# Patient Record
Sex: MALE | Race: WHITE | Employment: FULL TIME | URBAN - METROPOLITAN AREA
[De-identification: names, ages, dates, MRNs, and addresses within clinical notes are randomized per-mention and may not be internally consistent; named-entity substitution may affect disease eponyms.]

---

## 2019-12-09 ENCOUNTER — HOSPITAL ENCOUNTER (OUTPATIENT)
Age: 59
Discharge: HOME | End: 2019-12-11
Payer: COMMERCIAL

## 2019-12-09 LAB
ALBUMIN SERPL-MCNC: 4.7 G/DL (ref 3.5–5.2)
ALP BLD-CCNC: 96 U/L (ref 40–129)
ALT SERPL-CCNC: 22 U/L (ref 0–40)
ANION GAP SERPL CALCULATED.3IONS-SCNC: 14 MMOL/L (ref 7–16)
AST SERPL-CCNC: 27 U/L (ref 0–39)
BASOPHILS ABSOLUTE: 0.04 E9/L (ref 0–0.2)
BASOPHILS RELATIVE PERCENT: 0.6 % (ref 0–2)
BILIRUB SERPL-MCNC: 0.6 MG/DL (ref 0–1.2)
BUN BLDV-MCNC: 13 MG/DL (ref 6–20)
CALCIUM SERPL-MCNC: 9.1 MG/DL (ref 8.6–10.2)
CHLORIDE BLD-SCNC: 89 MMOL/L (ref 98–107)
CHOLESTEROL, TOTAL: 221 MG/DL (ref 0–199)
CO2: 23 MMOL/L (ref 22–29)
CREAT SERPL-MCNC: 0.9 MG/DL (ref 0.7–1.2)
EOSINOPHILS ABSOLUTE: 0.15 E9/L (ref 0.05–0.5)
EOSINOPHILS RELATIVE PERCENT: 2.2 % (ref 0–6)
GFR AFRICAN AMERICAN: >60
GFR NON-AFRICAN AMERICAN: >60 ML/MIN/1.73
GLUCOSE BLD-MCNC: 84 MG/DL (ref 74–99)
HCT VFR BLD CALC: 44.6 % (ref 37–54)
HDLC SERPL-MCNC: 68 MG/DL
HEMOGLOBIN: 15.2 G/DL (ref 12.5–16.5)
IMMATURE GRANULOCYTES #: 0.01 E9/L
IMMATURE GRANULOCYTES %: 0.1 % (ref 0–5)
LDL CHOLESTEROL CALCULATED: 133 MG/DL (ref 0–99)
LYMPHOCYTES ABSOLUTE: 1.07 E9/L (ref 1.5–4)
LYMPHOCYTES RELATIVE PERCENT: 15.7 % (ref 20–42)
MCH RBC QN AUTO: 31.3 PG (ref 26–35)
MCHC RBC AUTO-ENTMCNC: 34.1 % (ref 32–34.5)
MCV RBC AUTO: 92 FL (ref 80–99.9)
MONOCYTES ABSOLUTE: 0.57 E9/L (ref 0.1–0.95)
MONOCYTES RELATIVE PERCENT: 8.4 % (ref 2–12)
NEUTROPHILS ABSOLUTE: 4.98 E9/L (ref 1.8–7.3)
NEUTROPHILS RELATIVE PERCENT: 73 % (ref 43–80)
PDW BLD-RTO: 11.6 FL (ref 11.5–15)
PLATELET # BLD: 267 E9/L (ref 130–450)
PMV BLD AUTO: 10.8 FL (ref 7–12)
POTASSIUM SERPL-SCNC: 4.4 MMOL/L (ref 3.5–5)
PROSTATE SPECIFIC ANTIGEN: 0.69 NG/ML (ref 0–4)
RBC # BLD: 4.85 E12/L (ref 3.8–5.8)
SODIUM BLD-SCNC: 126 MMOL/L (ref 132–146)
TOTAL PROTEIN: 7.7 G/DL (ref 6.4–8.3)
TRIGL SERPL-MCNC: 99 MG/DL (ref 0–149)
TSH SERPL DL<=0.05 MIU/L-ACNC: 4.6 UIU/ML (ref 0.27–4.2)
VLDLC SERPL CALC-MCNC: 20 MG/DL
WBC # BLD: 6.8 E9/L (ref 4.5–11.5)

## 2019-12-09 PROCEDURE — G0103 PSA SCREENING: HCPCS

## 2019-12-09 PROCEDURE — 80053 COMPREHEN METABOLIC PANEL: CPT

## 2019-12-09 PROCEDURE — 85025 COMPLETE CBC W/AUTO DIFF WBC: CPT

## 2019-12-09 PROCEDURE — 84443 ASSAY THYROID STIM HORMONE: CPT

## 2019-12-09 PROCEDURE — 80061 LIPID PANEL: CPT

## 2020-03-12 ENCOUNTER — HOSPITAL ENCOUNTER (OUTPATIENT)
Age: 60
Discharge: HOME | End: 2020-03-14
Payer: COMMERCIAL

## 2020-08-28 ENCOUNTER — HOSPITAL ENCOUNTER (OUTPATIENT)
Age: 60
Discharge: HOME | End: 2020-08-30
Payer: COMMERCIAL

## 2020-08-28 LAB
ALBUMIN SERPL-MCNC: 4.6 G/DL (ref 3.5–5.2)
ALP BLD-CCNC: 97 U/L (ref 40–129)
ALT SERPL-CCNC: 32 U/L (ref 0–40)
ANION GAP SERPL CALCULATED.3IONS-SCNC: 14 MMOL/L (ref 7–16)
AST SERPL-CCNC: 31 U/L (ref 0–39)
BASOPHILS ABSOLUTE: 0.04 E9/L (ref 0–0.2)
BASOPHILS RELATIVE PERCENT: 0.5 % (ref 0–2)
BILIRUB SERPL-MCNC: 0.6 MG/DL (ref 0–1.2)
BUN BLDV-MCNC: 8 MG/DL (ref 6–20)
CALCIUM SERPL-MCNC: 9.3 MG/DL (ref 8.6–10.2)
CHLORIDE BLD-SCNC: 90 MMOL/L (ref 98–107)
CO2: 27 MMOL/L (ref 22–29)
CREAT SERPL-MCNC: 0.9 MG/DL (ref 0.7–1.2)
EOSINOPHILS ABSOLUTE: 0.21 E9/L (ref 0.05–0.5)
EOSINOPHILS RELATIVE PERCENT: 2.8 % (ref 0–6)
GFR AFRICAN AMERICAN: >60
GFR NON-AFRICAN AMERICAN: >60 ML/MIN/1.73
GLUCOSE BLD-MCNC: 95 MG/DL (ref 74–99)
HCT VFR BLD CALC: 45.6 % (ref 37–54)
HEMOGLOBIN: 15.8 G/DL (ref 12.5–16.5)
IMMATURE GRANULOCYTES #: 0.02 E9/L
IMMATURE GRANULOCYTES %: 0.3 % (ref 0–5)
LYMPHOCYTES ABSOLUTE: 1.32 E9/L (ref 1.5–4)
LYMPHOCYTES RELATIVE PERCENT: 17.6 % (ref 20–42)
MCH RBC QN AUTO: 32.1 PG (ref 26–35)
MCHC RBC AUTO-ENTMCNC: 34.6 % (ref 32–34.5)
MCV RBC AUTO: 92.7 FL (ref 80–99.9)
MONOCYTES ABSOLUTE: 0.61 E9/L (ref 0.1–0.95)
MONOCYTES RELATIVE PERCENT: 8.1 % (ref 2–12)
NEUTROPHILS ABSOLUTE: 5.29 E9/L (ref 1.8–7.3)
NEUTROPHILS RELATIVE PERCENT: 70.7 % (ref 43–80)
PDW BLD-RTO: 11.6 FL (ref 11.5–15)
PLATELET # BLD: 263 E9/L (ref 130–450)
PMV BLD AUTO: 10.6 FL (ref 7–12)
POTASSIUM SERPL-SCNC: 4.4 MMOL/L (ref 3.5–5)
RBC # BLD: 4.92 E12/L (ref 3.8–5.8)
SODIUM BLD-SCNC: 131 MMOL/L (ref 132–146)
TOTAL PROTEIN: 7.4 G/DL (ref 6.4–8.3)
WBC # BLD: 7.5 E9/L (ref 4.5–11.5)

## 2020-08-28 PROCEDURE — 80053 COMPREHEN METABOLIC PANEL: CPT

## 2020-08-28 PROCEDURE — 85025 COMPLETE CBC W/AUTO DIFF WBC: CPT

## 2021-08-31 ENCOUNTER — HOSPITAL ENCOUNTER (EMERGENCY)
Age: 61
Discharge: HOME | End: 2021-08-31
Payer: COMMERCIAL

## 2021-08-31 VITALS — HEART RATE: 80 BPM | RESPIRATION RATE: 18 BRPM | OXYGEN SATURATION: 97 % | TEMPERATURE: 97.5 F

## 2021-09-01 ENCOUNTER — APPOINTMENT (OUTPATIENT)
Dept: GENERAL RADIOLOGY | Age: 61
End: 2021-09-01
Payer: COMMERCIAL

## 2021-09-01 ENCOUNTER — HOSPITAL ENCOUNTER (EMERGENCY)
Age: 61
Discharge: HOME | End: 2021-09-01
Attending: EMERGENCY MEDICINE
Payer: COMMERCIAL

## 2021-09-01 VITALS
OXYGEN SATURATION: 97 % | HEART RATE: 69 BPM | DIASTOLIC BLOOD PRESSURE: 80 MMHG | TEMPERATURE: 96 F | RESPIRATION RATE: 20 BRPM | SYSTOLIC BLOOD PRESSURE: 162 MMHG
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
## 2021-09-01 DIAGNOSIS — M79.642 HAND PAIN, LEFT: Primary | ICD-10-CM

## 2021-09-01 PROCEDURE — 73130 X-RAY EXAM OF HAND: CPT

## 2021-09-01 PROCEDURE — 99283 EMERGENCY DEPT VISIT LOW MDM: CPT

## 2021-09-01 ASSESSMENT — PAIN DESCRIPTION - LOCATION: LOCATION: WRIST

## 2021-09-01 ASSESSMENT — ENCOUNTER SYMPTOMS
EYE PAIN: 0
SORE THROAT: 0
DIARRHEA: 0
VOMITING: 0
ABDOMINAL PAIN: 0
BACK PAIN: 0
SHORTNESS OF BREATH: 0
NAUSEA: 0

## 2021-09-01 ASSESSMENT — PAIN DESCRIPTION - FREQUENCY: FREQUENCY: CONTINUOUS

## 2021-09-01 ASSESSMENT — PAIN DESCRIPTION - PROGRESSION: CLINICAL_PROGRESSION: NOT CHANGED

## 2021-09-01 ASSESSMENT — PAIN DESCRIPTION - ORIENTATION: ORIENTATION: LEFT

## 2021-09-01 ASSESSMENT — PAIN SCALES - GENERAL: PAINLEVEL_OUTOF10: 6

## 2021-09-01 ASSESSMENT — PAIN DESCRIPTION - DESCRIPTORS: DESCRIPTORS: ACHING;BURNING

## 2021-09-01 NOTE — ED PROVIDER NOTES
[address]      Pt Name: Georgette Walker  MRN: [id number]  [place] 1960  Date of evaluation: 9/1/2021      CHIEF COMPLAINT       Chief Complaint   Patient presents with    Fall     left wrist pain after fall         HPI  Georgette Walker is a 61 y.o. male who presents to the ED with a complaint of left hand pain after a fall yesterday at work at 4 PM.  Patient states he fell backward on an outstretched hand and was immediately able to stand up without difficulty. Patient denies loss of consciousness or head trauma. Patient complains of 5 out of 10 pain in his left hand that is worse with movement and palpation. He states he has numbness in 4 of his fingers. Patient denies fever, chills, nausea, vomiting, chest pain, or shortness of breath. Patient denies any drug use and has not taken anything for pain. Admits to drinking beer. Except as noted above the remainder of the review of systems was reviewed and negative. Review of Systems   Constitutional: Negative for chills and fever. HENT: Negative for ear pain and sore throat. Eyes: Negative for pain. Respiratory: Negative for shortness of breath. Cardiovascular: Negative for chest pain. Gastrointestinal: Negative for abdominal pain, diarrhea, nausea and vomiting. Genitourinary: Negative for flank pain. Musculoskeletal: Positive for arthralgias. Negative for back pain and neck pain. Skin: Negative for rash. Neurological: Positive for numbness. Negative for headaches. Psychiatric/Behavioral: Negative for behavioral problems. The patient is not nervous/anxious. Physical Exam  Constitutional:       General: He is not in acute distress. Appearance: Normal appearance. HENT:      Head: Normocephalic and atraumatic.       Right Ear: External ear normal.      Left Ear: External ear normal.      Nose: Nose normal.      Mouth/Throat: Mouth: Mucous membranes are moist.      Pharynx: No oropharyngeal exudate. Eyes:      Extraocular Movements: Extraocular movements intact. Conjunctiva/sclera: Conjunctivae normal.      Pupils: Pupils are equal, round, and reactive to light. Cardiovascular:      Rate and Rhythm: Normal rate and regular rhythm. Pulses: Normal pulses. Heart sounds: Normal heart sounds. Pulmonary:      Effort: Pulmonary effort is normal. No respiratory distress. Breath sounds: Normal breath sounds. Abdominal:      Tenderness: There is no abdominal tenderness. There is no guarding or rebound. Musculoskeletal:         General: Swelling and tenderness (Left hand) present. No deformity or signs of injury. Normal range of motion. Cervical back: Normal range of motion and neck supple. Skin:     General: Skin is warm and dry. Capillary Refill: Capillary refill takes less than 2 seconds. Neurological:      General: No focal deficit present. Mental Status: He is alert. Mental status is at baseline. Sensory: No sensory deficit. Psychiatric:         Mood and Affect: Mood normal.          Procedures     MDM   Patient is a 60-year-old male who presents with left hand pain after a fall at work. We ordered an x-ray of the hand. X-ray showed no fractures. We gave him a Velcro wrist splint and sent him home with a return to work order on 7 September and instructions to follow-up with Ortho. Patient is awake alert, hemodynamic stable, afebrile and in no respiratory distress. Discussed with patient plan for close outpatient follow-up with the patient's PCP as well as return precautions and the patient understands and agrees to the plan.    --------------------------------------------- PAST HISTORY ---------------------------------------------  Past Medical History:  has a past medical history of Alcohol abuse, Hyperlipidemia, and Hypertension.     Past Surgical History:  has a past surgical history that includes Appendectomy. Social History:  reports that he has been smoking cigarettes. He has been smoking about 1.50 packs per day. He does not have any smokeless tobacco history on file. He reports current alcohol use. Family History: family history is not on file. The patients home medications have been reviewed. Allergies: Ativan [lorazepam]    -------------------------------------------------- RESULTS -------------------------------------------------  Labs:  No results found for this visit on 09/01/21. Radiology:  XR HAND LEFT (MIN 3 VIEWS)   Final Result   1. There is no fracture dislocation of the left hand   2. Degenerative joint disease involving the 1st carpometacarpal joint.             ------------------------- NURSING NOTES AND VITALS REVIEWED ---------------------------  Date / Time Roomed:  9/1/2021  7:00 AM  ED Bed Assignment:  [id number]    The nursing notes within the ED encounter and vital signs as below have been reviewed. BP (!) 162/80   Pulse 69   Temp 96 °F (35.6 °C) (Infrared)   Resp 20   SpO2 97%   Oxygen Saturation Interpretation: normal      ------------------------------------------ PROGRESS NOTES ------------------------------------------    I have spoken with the patient and discussed todays results, in addition to providing specific details for the plan of care and counseling regarding the diagnosis and prognosis. Their questions are answered at this time and they are agreeable with the plan. I discussed at length with them reasons for immediate return here for re evaluation. They will followup with their PCP by calling their office tomorrow. --------------------------------- ADDITIONAL PROVIDER NOTES ---------------------------------  At this time the patient is without objective evidence of an acute process requiring hospitalization or inpatient management.   They have remained hemodynamically stable throughout their entire ED visit and are

## 2024-04-10 ENCOUNTER — OFFICE VISIT (OUTPATIENT)
Dept: PODIATRY | Age: 64
End: 2024-04-10
Payer: COMMERCIAL

## 2024-04-10 VITALS — WEIGHT: 210 LBS | BODY MASS INDEX: 32.96 KG/M2 | HEIGHT: 67 IN

## 2024-04-10 DIAGNOSIS — L60.0 OC (ONYCHOCRYPTOSIS): ICD-10-CM

## 2024-04-10 DIAGNOSIS — R26.2 DIFFICULTY WALKING: ICD-10-CM

## 2024-04-10 DIAGNOSIS — L84 CORNS AND CALLUS: ICD-10-CM

## 2024-04-10 DIAGNOSIS — M20.41 HAMMER TOE OF RIGHT FOOT: ICD-10-CM

## 2024-04-10 DIAGNOSIS — B35.1 ONYCHOMYCOSIS: Primary | ICD-10-CM

## 2024-04-10 PROCEDURE — 99203 OFFICE O/P NEW LOW 30 MIN: CPT | Performed by: PODIATRIST

## 2024-04-10 RX ORDER — ATORVASTATIN CALCIUM 20 MG/1
20 TABLET, FILM COATED ORAL DAILY
COMMUNITY
Start: 2023-01-12

## 2024-04-10 NOTE — PROGRESS NOTES
4/10/24     Edoneil Eckroate    : 1960 Sex: male   Age: 63 y.o.    Patient was referred by: None  Patient's PCP/Provider is:  Rebel Rubio MD    Subjective:    Patient is seen today for evaluation regarding painful corn/callus right fifth toe.  Patient also having issues with chronic nail dystrophy and ingrown issues to both feet.  No other abnormalities noted at this time.    Chief Complaint   Patient presents with    Toe Pain     Right fifth toe lesion, ? wart    Nail Problem     NAIL CARE       HPI: Patient stated he has tried multiple OTC options without improvement noted right fifth toe.  Patient stated he has tried debriding his ingrown nails without improvement as well.  Patient does try to wear appropriate shoe gear on a daily basis.  No other additional abnormalities noted.    ROS:  Const: Positives and pertinent negatives as per HPI.     Musculo: Denies symptoms other than stated above.  Neuro: Denies symptoms other than stated above.  Skin: Denies symptoms other than stated above.    Current Medications:    Current Outpatient Medications:     atorvastatin (LIPITOR) 20 MG tablet, Take 1 tablet by mouth daily, Disp: , Rfl:     vitamin B-1 100 MG tablet, Take 1 tablet by mouth daily, Disp: 30 tablet, Rfl: 3    Metoprolol Tartrate (LOPRESSOR PO), Take 50 mg by mouth daily , Disp: , Rfl:     Sertraline HCl (ZOLOFT PO), Take 50 mg by mouth daily , Disp: , Rfl:     Allergies:  Allergies   Allergen Reactions    Ativan [Lorazepam] Other (See Comments)     Violent reaction       Vitals:    04/10/24 0839   Weight: 95.3 kg (210 lb)   Height: 1.702 m (5' 7\")        Past Medical History:   Diagnosis Date    Alcohol abuse 8/31/15    Hyperlipidemia     Hypertension      No family history on file.  Past Surgical History:   Procedure Laterality Date    APPENDECTOMY       Social History     Tobacco Use    Smoking status: Every Day     Current packs/day: 1.50     Types: Cigarettes   Substance Use Topics    Alcohol

## 2024-04-10 NOTE — PROGRESS NOTES
New patient here for right fifth toe lesion. Patient thinks it is a wart. Rebel Rubio MD Patient does not know last time he saw his Primary Care Doctor.   Electronically signed by Bryanna Cordova LPN on 4/10/2024 at 8:42 AM

## 2024-12-13 ENCOUNTER — HOSPITAL ENCOUNTER (OUTPATIENT)
Dept: GENERAL RADIOLOGY | Age: 64
Discharge: HOME | End: 2024-12-15
Payer: COMMERCIAL

## 2024-12-13 ENCOUNTER — HOSPITAL ENCOUNTER (OUTPATIENT)
Age: 64
Discharge: HOME | End: 2024-12-15
Payer: COMMERCIAL

## 2024-12-13 DIAGNOSIS — M48.062 SPINAL STENOSIS, LUMBAR REGION, WITH NEUROGENIC CLAUDICATION: ICD-10-CM

## 2024-12-13 PROCEDURE — 72100 X-RAY EXAM L-S SPINE 2/3 VWS: CPT

## 2025-01-20 ENCOUNTER — TELEPHONE (OUTPATIENT)
Dept: INTERVENTIONAL RADIOLOGY/VASCULAR | Age: 65
End: 2025-01-20

## 2025-01-20 NOTE — TELEPHONE ENCOUNTER
Spoke with patient and confirmed vascular testing appointment on 01/21/2025 at 8:00 am. Instructed patient to arrive 15 minutes prior to appointment time, Enter through Entrance B, register to right of entrance, and report to Cardiac Services for test. Patient verbalized understanding. Questions answered.

## 2025-01-21 ENCOUNTER — HOSPITAL ENCOUNTER (OUTPATIENT)
Dept: INTERVENTIONAL RADIOLOGY/VASCULAR | Age: 65
Discharge: HOME | End: 2025-01-23
Payer: COMMERCIAL

## 2025-01-21 DIAGNOSIS — I70.213 ATHEROSCLEROSIS OF NATIVE ARTERY OF BOTH LOWER EXTREMITIES WITH INTERMITTENT CLAUDICATION (HCC): ICD-10-CM

## 2025-01-21 DIAGNOSIS — R73.9 HYPERGLYCEMIA: ICD-10-CM

## 2025-01-21 DIAGNOSIS — I10 ESSENTIAL HYPERTENSION, BENIGN: ICD-10-CM

## 2025-01-21 PROCEDURE — 93925 LOWER EXTREMITY STUDY: CPT

## 2025-01-24 ENCOUNTER — TRANSCRIBE ORDERS (OUTPATIENT)
Dept: ADMINISTRATIVE | Age: 65
End: 2025-01-24

## 2025-01-24 DIAGNOSIS — I65.23 OCCLUSION AND STENOSIS OF BILATERAL CAROTID ARTERIES: Primary | ICD-10-CM

## 2025-01-27 ENCOUNTER — TRANSCRIBE ORDERS (OUTPATIENT)
Dept: ADMINISTRATIVE | Age: 65
End: 2025-01-27

## 2025-01-27 ENCOUNTER — TELEPHONE (OUTPATIENT)
Dept: VASCULAR SURGERY | Age: 65
End: 2025-01-27

## 2025-01-27 DIAGNOSIS — I70.213 ATHEROSCLEROSIS OF NATIVE ARTERIES OF EXTREMITIES WITH INTERMITTENT CLAUDICATION, BILATERAL LEGS (HCC): Primary | ICD-10-CM

## 2025-01-27 DIAGNOSIS — I70.213 ATHSCL NATIVE ARTERIES OF EXTRM W INTRMT CLAUD, BI LEGS (HCC): Primary | ICD-10-CM

## 2025-01-27 NOTE — TELEPHONE ENCOUNTER
Received referral from Dr. Rubio for Dr. Montgomery regarding PAD, left message for patient to return call to schedule in [place]

## 2025-01-30 ENCOUNTER — TELEPHONE (OUTPATIENT)
Dept: VASCULAR SURGERY | Age: 65
End: 2025-01-30

## 2025-01-30 NOTE — TELEPHONE ENCOUNTER
Spoke with patient's girlfriend. Scheduled appointment to see Dr. Montgomery at the [facility] on 2-25-25 at 10:00 am.

## 2025-02-24 ENCOUNTER — HOSPITAL ENCOUNTER (OUTPATIENT)
Dept: CT IMAGING | Age: 65
Discharge: HOME | End: 2025-02-24
Attending: INTERNAL MEDICINE
Payer: COMMERCIAL

## 2025-02-24 ENCOUNTER — HOSPITAL ENCOUNTER (OUTPATIENT)
Dept: ULTRASOUND IMAGING | Age: 65
Discharge: HOME | End: 2025-02-24
Attending: INTERNAL MEDICINE
Payer: COMMERCIAL

## 2025-02-24 DIAGNOSIS — I70.213 ATHSCL NATIVE ARTERIES OF EXTRM W INTRMT CLAUD, BI LEGS: ICD-10-CM

## 2025-02-24 DIAGNOSIS — I70.213 ATHEROSCLEROSIS OF NATIVE ARTERIES OF EXTREMITIES WITH INTERMITTENT CLAUDICATION, BILATERAL LEGS: ICD-10-CM

## 2025-02-24 DIAGNOSIS — I65.23 OCCLUSION AND STENOSIS OF BILATERAL CAROTID ARTERIES: ICD-10-CM

## 2025-02-24 PROCEDURE — 73706 CT ANGIO LWR EXTR W/O&W/DYE: CPT

## 2025-02-24 PROCEDURE — 93880 EXTRACRANIAL BILAT STUDY: CPT

## 2025-02-24 PROCEDURE — 6360000004 HC RX CONTRAST MEDICATION: Performed by: RADIOLOGY

## 2025-02-24 RX ORDER — IOPAMIDOL 755 MG/ML
110 INJECTION, SOLUTION INTRAVASCULAR
Status: COMPLETED | OUTPATIENT
Start: 2025-02-24 | End: 2025-02-24

## 2025-02-24 RX ADMIN — IOPAMIDOL 110 ML: 755 INJECTION, SOLUTION INTRAVENOUS at 07:12

## 2025-02-25 ENCOUNTER — TELEPHONE (OUTPATIENT)
Dept: VASCULAR SURGERY | Age: 65
End: 2025-02-25

## 2025-02-25 ENCOUNTER — OFFICE VISIT (OUTPATIENT)
Dept: VASCULAR SURGERY | Age: 65
End: 2025-02-25
Payer: COMMERCIAL

## 2025-02-25 DIAGNOSIS — I70.213 ATHEROSCLEROSIS OF NATIVE ARTERY OF BOTH LOWER EXTREMITIES WITH INTERMITTENT CLAUDICATION: ICD-10-CM

## 2025-02-25 DIAGNOSIS — I70.213 ATHEROSCLEROSIS OF NATIVE ARTERY OF BOTH LOWER EXTREMITIES WITH INTERMITTENT CLAUDICATION: Primary | ICD-10-CM

## 2025-02-25 PROCEDURE — 99203 OFFICE O/P NEW LOW 30 MIN: CPT | Performed by: SURGERY

## 2025-02-25 RX ORDER — GABAPENTIN 100 MG/1
100 CAPSULE ORAL 3 TIMES DAILY
COMMUNITY

## 2025-02-25 RX ORDER — CILOSTAZOL 100 MG/1
100 TABLET ORAL 2 TIMES DAILY
Qty: 60 TABLET | Refills: 3 | OUTPATIENT
Start: 2025-02-25

## 2025-02-25 RX ORDER — CILOSTAZOL 100 MG/1
100 TABLET ORAL 2 TIMES DAILY
Qty: 60 TABLET | Refills: 3 | Status: SHIPPED | OUTPATIENT
Start: 2025-02-25

## 2025-02-25 RX ORDER — FUROSEMIDE 40 MG/1
40 TABLET ORAL DAILY
COMMUNITY
Start: 2024-12-17

## 2025-02-25 RX ORDER — UBIDECARENONE 75 MG
200 CAPSULE ORAL DAILY
COMMUNITY

## 2025-02-25 RX ORDER — LISINOPRIL 20 MG/1
TABLET ORAL
COMMUNITY

## 2025-02-25 RX ORDER — BUPROPION HYDROCHLORIDE 150 MG/1
150 TABLET, EXTENDED RELEASE ORAL 2 TIMES DAILY
COMMUNITY
Start: 2024-12-30

## 2025-02-25 RX ORDER — TIOTROPIUM BROMIDE INHALATION SPRAY 3.12 UG/1
2 SPRAY, METERED RESPIRATORY (INHALATION)
COMMUNITY
Start: 2024-12-22

## 2025-02-25 NOTE — PROGRESS NOTES
Vascular Surgery Outpatient Consultation      Chief Complaint   Patient presents with    Surgical Consult     Pain lower legs, feet feel numb, no swelling.        Reason for Consult: Peripheral vascular disease    Requesting Physician:  Dr. Rubio    HISTORY OF PRESENT ILLNESS:                The patient is a 64 y.o. male who is referred for evaluation of peripheral vascular disease.  The patient is accompanied by his girlfriend.  He has a long history of smoking and known vascular disease.  He has a history of COPD and alcohol use.  More recently, he has been experiencing disabling claudication.  He denies foot ulcerations or rest pain.  He states that with prolonged walking, he develops significant cramping in his calfs.  He does not notice 1 leg worse than the other.  He admits to continued smoking slightly less than 1 pack/day.    Past Medical History:        Diagnosis Date    Alcohol abuse 8/31/15    Hyperlipidemia     Hypertension      Past Surgical History:        Procedure Laterality Date    APPENDECTOMY       Current Medications:   Prior to Admission medications    Medication Sig Start Date End Date Taking? Authorizing Provider   buPROPion (WELLBUTRIN SR) 150 MG extended release tablet Take 1 tablet by mouth 2 times daily 12/30/24  Yes Taiwo Cee MD   Coenzyme Q10 (CO Q-10) 200 MG CAPS Take 1 capsule by mouth daily   Yes ProviderTaiwo MD   furosemide (LASIX) 40 MG tablet Take 1 tablet by mouth daily 12/17/24  Yes Taiwo Cee MD   gabapentin (NEURONTIN) 100 MG capsule Take 1 capsule by mouth 3 times daily.   Yes Taiwo Cee MD   lisinopril (PRINIVIL;ZESTRIL) 20 MG tablet Take by mouth   Yes Taiwo Cee MD   SPIRIVA RESPIMAT 2.5 MCG/ACT AERS inhaler Inhale 2 puffs into the lungs daily 12/22/24  Yes Taiwo Cee MD   cilostazol (PLETAL) 100 MG tablet Take 1 tablet by mouth 2 times daily 2/25/25  Yes Froylan Montgomery MD   atorvastatin (LIPITOR) 20 MG

## 2025-02-25 NOTE — TELEPHONE ENCOUNTER
I called Bobby to inform him , he has a scheduled appointment. His appointment is at [facility] on May 19, 2025 , at 10:15 am. to register for a 10:30 Ultra Sound. He can enter in [employer]. Bobby should bring his insurance cards, photo Id. And his list of Medications.

## 2025-02-27 ENCOUNTER — TRANSCRIBE ORDERS (OUTPATIENT)
Dept: ADMINISTRATIVE | Age: 65
End: 2025-02-27

## 2025-02-27 DIAGNOSIS — I70.213 ATHSCL NATIVE ARTERIES OF EXTRM W INTRMT CLAUD, BI LEGS: Primary | ICD-10-CM

## 2025-02-27 DIAGNOSIS — I65.23 OCCLUSION AND STENOSIS OF BILATERAL CAROTID ARTERIES: ICD-10-CM

## 2025-05-16 ENCOUNTER — TELEPHONE (OUTPATIENT)
Dept: INTERVENTIONAL RADIOLOGY/VASCULAR | Age: 65
End: 2025-05-16

## 2025-05-16 NOTE — TELEPHONE ENCOUNTER
Spoke with patient's girlfriend Phyllis and confirmed vascular testing appointment on 05/19/2025 at 11:00 am. Instructed patient to arrive 20 minutes prior to appointment time, Enter through Entrance B, register to right of entrance, and report to Cardiac Services for test.

## 2025-05-19 ENCOUNTER — APPOINTMENT (OUTPATIENT)
Dept: ULTRASOUND IMAGING | Age: 65
End: 2025-05-19
Attending: INTERNAL MEDICINE
Payer: COMMERCIAL

## 2025-05-19 ENCOUNTER — HOSPITAL ENCOUNTER (OUTPATIENT)
Dept: INTERVENTIONAL RADIOLOGY/VASCULAR | Age: 65
Discharge: HOME | End: 2025-05-21
Attending: SURGERY
Payer: COMMERCIAL

## 2025-05-19 DIAGNOSIS — I70.213 ATHEROSCLEROSIS OF NATIVE ARTERY OF BOTH LOWER EXTREMITIES WITH INTERMITTENT CLAUDICATION: ICD-10-CM

## 2025-05-19 PROCEDURE — 93922 UPR/L XTREMITY ART 2 LEVELS: CPT

## 2025-05-27 ENCOUNTER — OFFICE VISIT (OUTPATIENT)
Dept: VASCULAR SURGERY | Age: 65
End: 2025-05-27
Payer: COMMERCIAL

## 2025-05-27 ENCOUNTER — TELEPHONE (OUTPATIENT)
Dept: VASCULAR SURGERY | Age: 65
End: 2025-05-27

## 2025-05-27 DIAGNOSIS — I73.9 PVD (PERIPHERAL VASCULAR DISEASE) WITH CLAUDICATION: Primary | ICD-10-CM

## 2025-05-27 PROCEDURE — 99213 OFFICE O/P EST LOW 20 MIN: CPT

## 2025-05-27 NOTE — TELEPHONE ENCOUNTER
Scheduled abdominal aortogram possible intervention with Dr. Hagen 6/4/25 at 7:30 a.m.  Instructions given to report to [address] floor registration at 6:15 a.m, be NPO after midnight the night before except heart and/or BP meds the morning of with sips of water and must have transportation.

## 2025-05-27 NOTE — PROGRESS NOTES
Vascular Surgery Outpatient Progress Note      Chief Complaint   Patient presents with    Follow-up     Atherosclerosis.        HISTORY OF PRESENT ILLNESS:                The patient is a 64 y.o. male who returns for follow up evaluation of peripheral vascular disease with disabling claudication.  Since last seen he has stopped smoking.  He has been taking pletal with no relief in his symptoms.  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  He has to stop and rest and the symptoms improve.  The right leg is more painful than the left.  He cannot take the pain and feels something needs done.  He states he may need to take a leave of absence from work due to the pain.     Past Medical History:        Diagnosis Date    Alcohol abuse 8/31/15    Hyperlipidemia     Hypertension      Past Surgical History:        Procedure Laterality Date    APPENDECTOMY       Current Medications:   Prior to Admission medications    Medication Sig Start Date End Date Taking? Authorizing Provider   buPROPion (WELLBUTRIN SR) 150 MG extended release tablet Take 1 tablet by mouth 2 times daily 12/30/24  Yes Taiwo Cee MD   Coenzyme Q10 (CO Q-10) 200 MG CAPS Take 1 capsule by mouth daily   Yes Taiwo Cee MD   furosemide (LASIX) 40 MG tablet Take 1 tablet by mouth daily 12/17/24  Yes Taiwo Cee MD   gabapentin (NEURONTIN) 100 MG capsule Take 1 capsule by mouth 3 times daily.   Yes Taiwo Cee MD   lisinopril (PRINIVIL;ZESTRIL) 20 MG tablet Take by mouth   Yes Taiwo Cee MD   SPIRIVA RESPIMAT 2.5 MCG/ACT AERS inhaler Inhale 2 puffs into the lungs daily 12/22/24  Yes Taiwo Cee MD   cilostazol (PLETAL) 100 MG tablet Take 1 tablet by mouth 2 times daily 2/25/25  Yes Froylan Montgomery MD   atorvastatin (LIPITOR) 20 MG tablet Take 1 tablet by mouth daily 1/12/23  Yes Taiwo Cee MD   vitamin B-1 100 MG tablet Take 1 tablet by mouth daily

## 2025-05-27 NOTE — H&P (VIEW-ONLY)
Nausea or vomiting:  No [x]/Yes []               Abdominal pain:  No [x]/Yes []                     Intestinal bleeding: No [x]/Yes []  Musculoskeletal:             Leg pain:   No []/Yes [x]      Back pain:   No [x]/Yes []                    Weakness:   No [x]/Yes []  Neurologic:             Numbness:   No []/Yes [x]      Paralysis:   No [x]/Yes []                       Headaches:   No [x]/Yes []  Hematologic, lymphatic:   Anemia:   No [x]/Yes []              Bleeding or bruising:  No [x]/Yes []              Fevers or chills: No [x]/Yes []  Endocrine:             Temp intolerance:   No [x]/Yes []                       Polydipsia, polyuria:  No [x]/Yes []  Skin:              Rash:    No [x]/Yes []      Ulcers:   No [x]/Yes []              Abnorm pigment: No [x]/Yes []  :              Frequency/urgency:  No [x]/Yes []      Hematuria:    No [x]/Yes []                      Incontinence:    No [x]/Yes []    PHYSICAL EXAM:  There were no vitals filed for this visit.  General Appearance: alert and oriented to person, place and time, well developed and well- nourished, in no acute distress  Skin: warm and dry, no rash or erythema  Head: normocephalic and atraumatic  Eyes: extraocular eye movements intact, conjunctivae normal  ENT: external ear and ear canal normal bilaterally, nose without deformity  Pulmonary/Chest: clear to auscultation bilaterally- no wheezes, rales or rhonchi, normal air movement, no respiratory distress  Cardiovascular: normal rate, regular rhythm, normal S1 and S2, no murmurs, no carotid bruits  Abdomen: soft, non-tender, non-distended, normal bowel sounds, no masses or organomegaly  Musculoskeletal: normal range of motion, no joint swelling, deformity or tenderness  Neurologic: no cranial nerve deficit, gait, coordination and speech normal  Extremities: no leg edema bilaterally    PULSE EXAM      Right      Left   Brachial     Radial 2 2   Femoral     Popliteal 0 0   Dorsalis Pedis 0 0   Posterior

## 2025-06-04 ENCOUNTER — HOSPITAL ENCOUNTER (OUTPATIENT)
Age: 65
Discharge: HOME | End: 2025-06-04
Attending: STUDENT IN AN ORGANIZED HEALTH CARE EDUCATION/TRAINING PROGRAM | Admitting: STUDENT IN AN ORGANIZED HEALTH CARE EDUCATION/TRAINING PROGRAM
Payer: COMMERCIAL

## 2025-06-04 VITALS
RESPIRATION RATE: 15 BRPM | SYSTOLIC BLOOD PRESSURE: 119 MMHG | HEART RATE: 86 BPM | WEIGHT: 205 LBS | BODY MASS INDEX: 32.18 KG/M2 | OXYGEN SATURATION: 98 % | DIASTOLIC BLOOD PRESSURE: 79 MMHG | HEIGHT: 67 IN | TEMPERATURE: 98.1 F

## 2025-06-04 DIAGNOSIS — I73.9 PERIPHERAL VASCULAR DISEASE, UNSPECIFIED: ICD-10-CM

## 2025-06-04 LAB
ABO + RH BLD: NORMAL
ANION GAP SERPL CALCULATED.3IONS-SCNC: 13 MMOL/L (ref 7–16)
ARM BAND NUMBER: NORMAL
BLOOD BANK SAMPLE EXPIRATION: NORMAL
BLOOD GROUP ANTIBODIES SERPL: NEGATIVE
BUN SERPL-MCNC: 9 MG/DL (ref 8–23)
CALCIUM SERPL-MCNC: 9.7 MG/DL (ref 8.8–10.2)
CHLORIDE SERPL-SCNC: 92 MMOL/L (ref 98–107)
CO2 SERPL-SCNC: 25 MMOL/L (ref 22–29)
CREAT SERPL-MCNC: 0.8 MG/DL (ref 0.7–1.2)
ECHO BSA: 2.1 M2
ERYTHROCYTE [DISTWIDTH] IN BLOOD BY AUTOMATED COUNT: 11.8 % (ref 11.5–15)
GFR, ESTIMATED: >90 ML/MIN/1.73M2
GLUCOSE SERPL-MCNC: 125 MG/DL (ref 74–99)
HCT VFR BLD AUTO: 41.9 % (ref 37–54)
HGB BLD-MCNC: 15.3 G/DL (ref 12.5–16.5)
MCH RBC QN AUTO: 32.1 PG (ref 26–35)
MCHC RBC AUTO-ENTMCNC: 36.5 G/DL (ref 32–34.5)
MCV RBC AUTO: 88 FL (ref 80–99.9)
PLATELET # BLD AUTO: 249 K/UL (ref 130–450)
PMV BLD AUTO: 9.3 FL (ref 7–12)
POTASSIUM SERPL-SCNC: 3.9 MMOL/L (ref 3.5–5.1)
RBC # BLD AUTO: 4.76 M/UL (ref 3.8–5.8)
SODIUM SERPL-SCNC: 130 MMOL/L (ref 136–145)
WBC OTHER # BLD: 5.7 K/UL (ref 4.5–11.5)

## 2025-06-04 PROCEDURE — 86901 BLOOD TYPING SEROLOGIC RH(D): CPT

## 2025-06-04 PROCEDURE — 6360000002 HC RX W HCPCS: Performed by: STUDENT IN AN ORGANIZED HEALTH CARE EDUCATION/TRAINING PROGRAM

## 2025-06-04 PROCEDURE — 2709999900 HC NON-CHARGEABLE SUPPLY: Performed by: STUDENT IN AN ORGANIZED HEALTH CARE EDUCATION/TRAINING PROGRAM

## 2025-06-04 PROCEDURE — C1725 CATH, TRANSLUMIN NON-LASER: HCPCS | Performed by: STUDENT IN AN ORGANIZED HEALTH CARE EDUCATION/TRAINING PROGRAM

## 2025-06-04 PROCEDURE — 76937 US GUIDE VASCULAR ACCESS: CPT | Performed by: STUDENT IN AN ORGANIZED HEALTH CARE EDUCATION/TRAINING PROGRAM

## 2025-06-04 PROCEDURE — 6370000000 HC RX 637 (ALT 250 FOR IP): Performed by: STUDENT IN AN ORGANIZED HEALTH CARE EDUCATION/TRAINING PROGRAM

## 2025-06-04 PROCEDURE — 75774 ARTERY X-RAY EACH VESSEL: CPT | Performed by: STUDENT IN AN ORGANIZED HEALTH CARE EDUCATION/TRAINING PROGRAM

## 2025-06-04 PROCEDURE — 80048 BASIC METABOLIC PNL TOTAL CA: CPT

## 2025-06-04 PROCEDURE — 75625 CONTRAST EXAM ABDOMINL AORTA: CPT | Performed by: STUDENT IN AN ORGANIZED HEALTH CARE EDUCATION/TRAINING PROGRAM

## 2025-06-04 PROCEDURE — 86900 BLOOD TYPING SEROLOGIC ABO: CPT

## 2025-06-04 PROCEDURE — 37224 HC FEM POP TERRITORY PLASTY: CPT | Performed by: STUDENT IN AN ORGANIZED HEALTH CARE EDUCATION/TRAINING PROGRAM

## 2025-06-04 PROCEDURE — 75716 ARTERY X-RAYS ARMS/LEGS: CPT | Performed by: STUDENT IN AN ORGANIZED HEALTH CARE EDUCATION/TRAINING PROGRAM

## 2025-06-04 PROCEDURE — C1894 INTRO/SHEATH, NON-LASER: HCPCS | Performed by: STUDENT IN AN ORGANIZED HEALTH CARE EDUCATION/TRAINING PROGRAM

## 2025-06-04 PROCEDURE — C2623 CATH, TRANSLUMIN, DRUG-COAT: HCPCS | Performed by: STUDENT IN AN ORGANIZED HEALTH CARE EDUCATION/TRAINING PROGRAM

## 2025-06-04 PROCEDURE — 2580000003 HC RX 258

## 2025-06-04 PROCEDURE — 7100000010 HC PHASE II RECOVERY - FIRST 15 MIN: Performed by: STUDENT IN AN ORGANIZED HEALTH CARE EDUCATION/TRAINING PROGRAM

## 2025-06-04 PROCEDURE — C1760 CLOSURE DEV, VASC: HCPCS | Performed by: STUDENT IN AN ORGANIZED HEALTH CARE EDUCATION/TRAINING PROGRAM

## 2025-06-04 PROCEDURE — 6360000004 HC RX CONTRAST MEDICATION: Performed by: STUDENT IN AN ORGANIZED HEALTH CARE EDUCATION/TRAINING PROGRAM

## 2025-06-04 PROCEDURE — 2500000003 HC RX 250 WO HCPCS

## 2025-06-04 PROCEDURE — 85027 COMPLETE CBC AUTOMATED: CPT

## 2025-06-04 PROCEDURE — 7100000011 HC PHASE II RECOVERY - ADDTL 15 MIN: Performed by: STUDENT IN AN ORGANIZED HEALTH CARE EDUCATION/TRAINING PROGRAM

## 2025-06-04 PROCEDURE — C1887 CATHETER, GUIDING: HCPCS | Performed by: STUDENT IN AN ORGANIZED HEALTH CARE EDUCATION/TRAINING PROGRAM

## 2025-06-04 PROCEDURE — 6360000002 HC RX W HCPCS

## 2025-06-04 PROCEDURE — 86850 RBC ANTIBODY SCREEN: CPT

## 2025-06-04 PROCEDURE — C1769 GUIDE WIRE: HCPCS | Performed by: STUDENT IN AN ORGANIZED HEALTH CARE EDUCATION/TRAINING PROGRAM

## 2025-06-04 DEVICE — ANGIO-SEAL VIP VASCULAR CLOSURE DEVICE
Type: IMPLANTABLE DEVICE | Status: FUNCTIONAL
Brand: ANGIO-SEAL

## 2025-06-04 RX ORDER — SODIUM CHLORIDE 9 MG/ML
INJECTION, SOLUTION INTRAVENOUS PRN
Status: DISCONTINUED | OUTPATIENT
Start: 2025-06-04 | End: 2025-06-04 | Stop reason: HOSPADM

## 2025-06-04 RX ORDER — FENTANYL CITRATE 50 UG/ML
INJECTION, SOLUTION INTRAMUSCULAR; INTRAVENOUS PRN
Status: DISCONTINUED | OUTPATIENT
Start: 2025-06-04 | End: 2025-06-04 | Stop reason: HOSPADM

## 2025-06-04 RX ORDER — CLOPIDOGREL BISULFATE 75 MG/1
TABLET ORAL PRN
Status: DISCONTINUED | OUTPATIENT
Start: 2025-06-04 | End: 2025-06-04 | Stop reason: HOSPADM

## 2025-06-04 RX ORDER — ISOSORBIDE MONONITRATE 30 MG/1
30 TABLET, EXTENDED RELEASE ORAL DAILY
Status: ON HOLD | COMMUNITY

## 2025-06-04 RX ORDER — MIDAZOLAM HYDROCHLORIDE 1 MG/ML
INJECTION, SOLUTION INTRAMUSCULAR; INTRAVENOUS PRN
Status: DISCONTINUED | OUTPATIENT
Start: 2025-06-04 | End: 2025-06-04 | Stop reason: HOSPADM

## 2025-06-04 RX ORDER — SODIUM CHLORIDE 9 MG/ML
INJECTION, SOLUTION INTRAVENOUS CONTINUOUS
Status: DISCONTINUED | OUTPATIENT
Start: 2025-06-04 | End: 2025-06-04 | Stop reason: HOSPADM

## 2025-06-04 RX ORDER — HEPARIN SODIUM 1000 [USP'U]/ML
INJECTION, SOLUTION INTRAVENOUS; SUBCUTANEOUS PRN
Status: DISCONTINUED | OUTPATIENT
Start: 2025-06-04 | End: 2025-06-04 | Stop reason: HOSPADM

## 2025-06-04 RX ORDER — ACETAMINOPHEN 325 MG/1
650 TABLET ORAL EVERY 4 HOURS PRN
Status: DISCONTINUED | OUTPATIENT
Start: 2025-06-04 | End: 2025-06-04 | Stop reason: HOSPADM

## 2025-06-04 RX ORDER — ONDANSETRON 2 MG/ML
4 INJECTION INTRAMUSCULAR; INTRAVENOUS EVERY 8 HOURS PRN
Status: DISCONTINUED | OUTPATIENT
Start: 2025-06-04 | End: 2025-06-04 | Stop reason: HOSPADM

## 2025-06-04 RX ORDER — IOPAMIDOL 612 MG/ML
INJECTION, SOLUTION INTRAVASCULAR PRN
Status: DISCONTINUED | OUTPATIENT
Start: 2025-06-04 | End: 2025-06-04 | Stop reason: HOSPADM

## 2025-06-04 RX ORDER — SODIUM CHLORIDE 0.9 % (FLUSH) 0.9 %
5-40 SYRINGE (ML) INJECTION PRN
Status: DISCONTINUED | OUTPATIENT
Start: 2025-06-04 | End: 2025-06-04 | Stop reason: HOSPADM

## 2025-06-04 RX ORDER — CLOPIDOGREL BISULFATE 75 MG/1
75 TABLET ORAL DAILY
Status: ON HOLD | COMMUNITY

## 2025-06-04 RX ORDER — SODIUM CHLORIDE 0.9 % (FLUSH) 0.9 %
5-40 SYRINGE (ML) INJECTION EVERY 12 HOURS SCHEDULED
Status: DISCONTINUED | OUTPATIENT
Start: 2025-06-04 | End: 2025-06-04 | Stop reason: HOSPADM

## 2025-06-04 RX ADMIN — SODIUM CHLORIDE 75 ML/HR: 0.9 INJECTION, SOLUTION INTRAVENOUS at 06:56

## 2025-06-04 NOTE — OP NOTE
Operative Note      Patient: Bobby Dubose  YOB: 1960  MRN: [id number]    Date of Procedure: 6/4/2025    Pre-Op Diagnosis Codes:      * Peripheral vascular disease, unspecified [I73.9]    Post-Op Diagnosis: Same       Procedure(s):  Aortagram abdominal  Pta femoral popliteal artery  Ultrasound-guided access of the left common femoral artery with static images saved to our system  Abdominal aortogram with bilateral lower extremity runoff  Right lower extremity angiogram, selective, final catheter positions in right common femoral artery, right popliteal artery  Right superficial femoral artery lithotripsy with shockwave 6 x 80 mm balloon, 9 total cycles of 40 pulses, right SFA angioplasty 6 x 250 mm Admiral Impact drug-coated balloon, right popliteal artery angioplasty 5 x 150 mm Admiral Impact drug-coated balloon  Conscious moderate sedation for a total of 45 minutes using a combination of Versed and fentanyl administered by dedicated cardiovascular nurse at my request.  Constant hemodynamic monitoring was performed during this counter, I performed continuous face-to-face assessment during this encounter, I was the sole attending provider responsible for conscious moderate sedation.    Surgeon(s):  Elvira Hagen MD    Assistant:   * No surgical staff found *    Anesthesia: None    Estimated Blood Loss (mL): Minimal    Complications: None    Specimens:   * No specimens in log *    Implants:  Implant Name Type Inv. Item Serial No.  Lot No. LRB No. Used Action   DEVICE CLSR ANGIO-SEAL VIP 6FR 0.035IN V TWST INTEGR PLATFRM - [id number]  DEVICE CLSR ANGIO-SEAL VIP 6FR 0.035IN V TWST INTEGR PLATFRM  [employer] [id number]-[id number] N/A 1 Implanted         Drains: * No LDAs found *    Findings:  Infection Present At Time Of Surgery (PATOS) (choose all levels that have infection present):  No infection present  Other Findings:   Ultrasound of the left groin demonstrates patent SFA and profunda,

## 2025-06-04 NOTE — DISCHARGE INSTRUCTIONS
Discharge Instructions for Lower extremity angiogram    Call Dr. Hagen's office [phone] for follow-up appointment.    Groin Care  - keep clean and dry  - ok to shower or sponge bath  - ok to clean site with lukewarm water and mild soap  - use a soft wash cloth to gently wipe the incision area  - do not scrub the incision areas  - no swimming or baths    Home Care    Follow these guidelines after surgery:   Rest. Try to move as tolerated. A mix of rest and light activity improves healing.   The incision area may be sore for a few days. To minimize pain and soreness:   Take pain medicine as directed.   Avoid strenuous activity and heavy lifting.       Diet    You can return to your regular diet. You may work with a dietician who will help you follow a heart-healthy diet.   Physical Activity    You will feel sore after the surgery. Try to walk steadily within two weeks. You may be able to return to normal activities within 1-3 weeks. While recovering, you will need to avoid strenuous activities, like heavy lifting.   Ask your doctor when you will be able to return to work.    Do not drive unless your doctor has given you permission to do so.    Medications    Your doctor may recommend:   Over-the-counter or prescription pain medicine   Aspirin or a cholesterol-lowering drug to prevent complications   If you had to stop medicines before the procedure, ask your doctor when you can start again. Medicines that may have been stopped include:   Anti-inflammatory drugs (eg, aspirin, ibuprofen)   Blood thinners, like warfarin (Coumadin)   Clopidogrel (Plavix)   When taking medicines:   Take your medicine as directed. Do not change the amount or the schedule.   Do not stop taking them without talking to your doctor.   Do not share them.   Know what results and side effects to look for. Report them to your doctor.   Some drugs can be dangerous when mixed. Talk to a doctor or pharmacist if you are taking more than one drug.

## 2025-06-04 NOTE — POST SEDATION
Sedation Post Procedure Note    Patient Name: Bobby Dubose   YOB: 1960  Room/Bed: Cath Pool Room/  Medical Record Number: [id number]  Date: 6/4/2025   Time: 8:56 AM         Physicians/Assistants: Elvira Hagen MD, MD    Procedure Performed:  angiogram    Post-Sedation Vital Signs:  Vitals:    06/04/25 0634   BP: 133/78   Pulse: (!) 102   Resp: (!) 102   Temp: 98.1 °F (36.7 °C)   SpO2: 96%      Vital signs were reviewed and were stable after the procedure (see flow sheet for vitals)            Post-Sedation Exam: [id number]           Complications: none    Electronically signed by Elvira Hagen MD on 6/4/2025 at 8:56 AM

## 2025-06-04 NOTE — PRE SEDATION
Sedation Plan  ASA: class 3 - patient with severe systemic disease     Mallampati class: III - soft palate, base of uvula visible.    Sedation plan: moderate (conscious sedation), level 2-1: moderate/analgesia (conscious sedation) and local anesthesia    Risks, benefits, and alternatives discussed with patient.        Immediate reassessment prior to sedation:  Patient's status reviewed and vital signs assessed; acceptable to perform procedure and proceed to administer sedation as planned.

## 2025-06-04 NOTE — PROGRESS NOTES
Pt dressed and ready for discharge.  Upon standing from wheelchair, pt stated \"[facility]\" and reached for left femoral site. Upon assessment pt found to be oozing from puncture site..  Pt laid flat on cart, manual pressure held, small  quarter size hematoma compressed.  Post manual compression x10 minutes, site soft, no further bleeding or hematoma noted.  Pt denies discomfort at this time.

## 2025-06-04 NOTE — INTERVAL H&P NOTE
Update History & Physical    The patient's History and Physical of May 27, 2025 was reviewed with the patient and I examined the patient. There was no change. The surgical site was confirmed by the patient and me.     Plan: The risks, benefits, expected outcome, and alternative to the recommended procedure have been discussed with the patient. Patient understands and wants to proceed with the procedure.     Electronically signed by Elvira Hagen MD on 6/4/2025 at 8:55 AM

## 2025-06-06 ENCOUNTER — TELEPHONE (OUTPATIENT)
Dept: VASCULAR SURGERY | Age: 65
End: 2025-06-06

## 2025-06-06 ENCOUNTER — APPOINTMENT (OUTPATIENT)
Dept: GENERAL RADIOLOGY | Age: 65
DRG: 378 | End: 2025-06-06
Payer: COMMERCIAL

## 2025-06-06 ENCOUNTER — HOSPITAL ENCOUNTER (INPATIENT)
Age: 65
LOS: 5 days | Discharge: HOME | DRG: 378 | End: 2025-06-11
Attending: EMERGENCY MEDICINE | Admitting: FAMILY MEDICINE
Payer: COMMERCIAL

## 2025-06-06 DIAGNOSIS — F10.930 ALCOHOL WITHDRAWAL SYNDROME WITHOUT COMPLICATION (HCC): ICD-10-CM

## 2025-06-06 DIAGNOSIS — F10.90 ALCOHOL USE DISORDER: ICD-10-CM

## 2025-06-06 DIAGNOSIS — K92.2 ACUTE UPPER GI HEMORRHAGE: Primary | ICD-10-CM

## 2025-06-06 DIAGNOSIS — K92.2 GASTROINTESTINAL HEMORRHAGE, UNSPECIFIED GASTROINTESTINAL HEMORRHAGE TYPE: ICD-10-CM

## 2025-06-06 DIAGNOSIS — I15.9 SECONDARY HYPERTENSION: ICD-10-CM

## 2025-06-06 PROBLEM — I70.209: Status: ACTIVE | Noted: 2025-06-06

## 2025-06-06 PROBLEM — Z98.62 S/P PERIPHERAL ARTERY ANGIOPLASTY: Status: ACTIVE | Noted: 2025-06-06

## 2025-06-06 PROBLEM — F10.20 ALCOHOLISM (HCC): Status: ACTIVE | Noted: 2025-06-06

## 2025-06-06 LAB
ALBUMIN SERPL-MCNC: 4 G/DL (ref 3.5–5.2)
ALP SERPL-CCNC: 75 U/L (ref 40–129)
ALT SERPL-CCNC: 20 U/L (ref 0–40)
ANION GAP SERPL CALCULATED.3IONS-SCNC: 17 MMOL/L (ref 7–16)
AST SERPL-CCNC: 19 U/L (ref 0–39)
BASOPHILS # BLD: 0.02 K/UL (ref 0–0.2)
BASOPHILS NFR BLD: 0 % (ref 0–2)
BILIRUB SERPL-MCNC: 0.3 MG/DL (ref 0–1.2)
BUN SERPL-MCNC: 59 MG/DL (ref 6–23)
CALCIUM SERPL-MCNC: 9.6 MG/DL (ref 8.6–10.2)
CHLORIDE SERPL-SCNC: 95 MMOL/L (ref 98–107)
CO2 SERPL-SCNC: 17 MMOL/L (ref 22–29)
CREAT SERPL-MCNC: 1.6 MG/DL (ref 0.7–1.2)
EKG ATRIAL RATE: 120 BPM
EKG P-R INTERVAL: 178 MS
EKG Q-T INTERVAL: 306 MS
EKG QRS DURATION: 84 MS
EKG QTC CALCULATION (BAZETT): 432 MS
EKG R AXIS: -74 DEGREES
EKG T AXIS: 74 DEGREES
EKG VENTRICULAR RATE: 120 BPM
EOSINOPHIL # BLD: 0.02 K/UL (ref 0.05–0.5)
EOSINOPHILS RELATIVE PERCENT: 0 % (ref 0–6)
ERYTHROCYTE [DISTWIDTH] IN BLOOD BY AUTOMATED COUNT: 11.9 % (ref 11.5–15)
GFR, ESTIMATED: 50 ML/MIN/1.73M2
GLUCOSE SERPL-MCNC: 145 MG/DL (ref 74–99)
HCT VFR BLD AUTO: 21.3 % (ref 37–54)
HCT VFR BLD AUTO: 24.8 % (ref 37–54)
HGB BLD-MCNC: 7.8 G/DL (ref 12.5–16.5)
HGB BLD-MCNC: 9.3 G/DL (ref 12.5–16.5)
IMM GRANULOCYTES # BLD AUTO: 0.04 K/UL (ref 0–0.58)
IMM GRANULOCYTES NFR BLD: 1 % (ref 0–5)
INR PPP: 1.1
LACTATE BLDV-SCNC: 1.3 MMOL/L (ref 0.5–2.2)
LACTATE BLDV-SCNC: 3.5 MMOL/L (ref 0.5–2.2)
LYMPHOCYTES NFR BLD: 0.74 K/UL (ref 1.5–4)
LYMPHOCYTES RELATIVE PERCENT: 10 % (ref 20–42)
MAGNESIUM SERPL-MCNC: 2.1 MG/DL (ref 1.6–2.6)
MAGNESIUM SERPL-MCNC: 2.1 MG/DL (ref 1.6–2.6)
MCH RBC QN AUTO: 33 PG (ref 26–35)
MCHC RBC AUTO-ENTMCNC: 37.5 G/DL (ref 32–34.5)
MCV RBC AUTO: 87.9 FL (ref 80–99.9)
MONOCYTES NFR BLD: 0.57 K/UL (ref 0.1–0.95)
MONOCYTES NFR BLD: 8 % (ref 2–12)
NEUTROPHILS NFR BLD: 82 % (ref 43–80)
NEUTS SEG NFR BLD: 6.23 K/UL (ref 1.8–7.3)
PLATELET # BLD AUTO: 286 K/UL (ref 130–450)
PMV BLD AUTO: 10.2 FL (ref 7–12)
POTASSIUM SERPL-SCNC: 4.3 MMOL/L (ref 3.5–5)
PROT SERPL-MCNC: 6.1 G/DL (ref 6.4–8.3)
PROTHROMBIN TIME: 12.2 SEC (ref 9.3–12.4)
RBC # BLD AUTO: 2.82 M/UL (ref 3.8–5.8)
SODIUM SERPL-SCNC: 129 MMOL/L (ref 132–146)
TROPONIN I SERPL HS-MCNC: 77 NG/L (ref 0–22)
TROPONIN I SERPL HS-MCNC: 95 NG/L (ref 0–22)
WBC OTHER # BLD: 7.6 K/UL (ref 4.5–11.5)

## 2025-06-06 PROCEDURE — 82746 ASSAY OF FOLIC ACID SERUM: CPT

## 2025-06-06 PROCEDURE — 6360000002 HC RX W HCPCS: Performed by: HOSPITALIST

## 2025-06-06 PROCEDURE — 80307 DRUG TEST PRSMV CHEM ANLYZR: CPT

## 2025-06-06 PROCEDURE — 36430 TRANSFUSION BLD/BLD COMPNT: CPT

## 2025-06-06 PROCEDURE — 94640 AIRWAY INHALATION TREATMENT: CPT

## 2025-06-06 PROCEDURE — 83550 IRON BINDING TEST: CPT

## 2025-06-06 PROCEDURE — 6370000000 HC RX 637 (ALT 250 FOR IP): Performed by: NURSE PRACTITIONER

## 2025-06-06 PROCEDURE — 85018 HEMOGLOBIN: CPT

## 2025-06-06 PROCEDURE — 86901 BLOOD TYPING SEROLOGIC RH(D): CPT

## 2025-06-06 PROCEDURE — APPSS45 APP SPLIT SHARED TIME 31-45 MINUTES: Performed by: NURSE PRACTITIONER

## 2025-06-06 PROCEDURE — P9016 RBC LEUKOCYTES REDUCED: HCPCS

## 2025-06-06 PROCEDURE — 85610 PROTHROMBIN TIME: CPT

## 2025-06-06 PROCEDURE — 2500000003 HC RX 250 WO HCPCS

## 2025-06-06 PROCEDURE — 82607 VITAMIN B-12: CPT

## 2025-06-06 PROCEDURE — 93010 ELECTROCARDIOGRAM REPORT: CPT | Performed by: INTERNAL MEDICINE

## 2025-06-06 PROCEDURE — 83605 ASSAY OF LACTIC ACID: CPT

## 2025-06-06 PROCEDURE — 2060000000 HC ICU INTERMEDIATE R&B

## 2025-06-06 PROCEDURE — 85014 HEMATOCRIT: CPT

## 2025-06-06 PROCEDURE — 86923 COMPATIBILITY TEST ELECTRIC: CPT

## 2025-06-06 PROCEDURE — 86850 RBC ANTIBODY SCREEN: CPT

## 2025-06-06 PROCEDURE — 6360000002 HC RX W HCPCS: Performed by: NURSE PRACTITIONER

## 2025-06-06 PROCEDURE — 96374 THER/PROPH/DIAG INJ IV PUSH: CPT

## 2025-06-06 PROCEDURE — 84484 ASSAY OF TROPONIN QUANT: CPT

## 2025-06-06 PROCEDURE — 99285 EMERGENCY DEPT VISIT HI MDM: CPT

## 2025-06-06 PROCEDURE — 85025 COMPLETE CBC W/AUTO DIFF WBC: CPT

## 2025-06-06 PROCEDURE — 6360000002 HC RX W HCPCS

## 2025-06-06 PROCEDURE — 2500000003 HC RX 250 WO HCPCS: Performed by: HOSPITALIST

## 2025-06-06 PROCEDURE — 2580000003 HC RX 258: Performed by: HOSPITALIST

## 2025-06-06 PROCEDURE — 93005 ELECTROCARDIOGRAM TRACING: CPT

## 2025-06-06 PROCEDURE — 83540 ASSAY OF IRON: CPT

## 2025-06-06 PROCEDURE — 2580000003 HC RX 258

## 2025-06-06 PROCEDURE — 80053 COMPREHEN METABOLIC PANEL: CPT

## 2025-06-06 PROCEDURE — 82728 ASSAY OF FERRITIN: CPT

## 2025-06-06 PROCEDURE — 80179 DRUG ASSAY SALICYLATE: CPT

## 2025-06-06 PROCEDURE — 74018 RADEX ABDOMEN 1 VIEW: CPT

## 2025-06-06 PROCEDURE — 83735 ASSAY OF MAGNESIUM: CPT

## 2025-06-06 PROCEDURE — 96365 THER/PROPH/DIAG IV INF INIT: CPT

## 2025-06-06 PROCEDURE — 80143 DRUG ASSAY ACETAMINOPHEN: CPT

## 2025-06-06 PROCEDURE — 86900 BLOOD TYPING SEROLOGIC ABO: CPT

## 2025-06-06 PROCEDURE — G0480 DRUG TEST DEF 1-7 CLASSES: HCPCS

## 2025-06-06 PROCEDURE — 30233N1 TRANSFUSION OF NONAUTOLOGOUS RED BLOOD CELLS INTO PERIPHERAL VEIN, PERCUTANEOUS APPROACH: ICD-10-PCS | Performed by: INTERNAL MEDICINE

## 2025-06-06 RX ORDER — OCTREOTIDE ACETATE 50 UG/ML
50 INJECTION, SOLUTION INTRAVENOUS; SUBCUTANEOUS ONCE
Status: COMPLETED | OUTPATIENT
Start: 2025-06-06 | End: 2025-06-06

## 2025-06-06 RX ORDER — GAUZE BANDAGE 2" X 2"
100 BANDAGE TOPICAL DAILY
Status: DISCONTINUED | OUTPATIENT
Start: 2025-06-06 | End: 2025-06-07

## 2025-06-06 RX ORDER — SODIUM CHLORIDE 9 MG/ML
INJECTION, SOLUTION INTRAVENOUS PRN
Status: DISCONTINUED | OUTPATIENT
Start: 2025-06-06 | End: 2025-06-11 | Stop reason: HOSPADM

## 2025-06-06 RX ORDER — ATORVASTATIN CALCIUM 40 MG/1
40 TABLET, FILM COATED ORAL DAILY
Status: DISCONTINUED | OUTPATIENT
Start: 2025-06-06 | End: 2025-06-06 | Stop reason: CLARIF

## 2025-06-06 RX ORDER — PHENOBARBITAL SODIUM 65 MG/ML
32.5 INJECTION, SOLUTION INTRAMUSCULAR; INTRAVENOUS EVERY 6 HOURS PRN
Status: DISPENSED | OUTPATIENT
Start: 2025-06-06 | End: 2025-06-08

## 2025-06-06 RX ORDER — PHENOBARBITAL SODIUM 65 MG/ML
16.2 INJECTION, SOLUTION INTRAMUSCULAR; INTRAVENOUS EVERY 12 HOURS
Status: COMPLETED | OUTPATIENT
Start: 2025-06-08 | End: 2025-06-09

## 2025-06-06 RX ORDER — PHENOBARBITAL SODIUM 65 MG/ML
32.5 INJECTION, SOLUTION INTRAMUSCULAR; INTRAVENOUS EVERY 6 HOURS
Status: DISCONTINUED | OUTPATIENT
Start: 2025-06-06 | End: 2025-06-06

## 2025-06-06 RX ORDER — CILOSTAZOL 100 MG/1
100 TABLET ORAL 2 TIMES DAILY
Status: DISCONTINUED | OUTPATIENT
Start: 2025-06-06 | End: 2025-06-06

## 2025-06-06 RX ORDER — BUPROPION HYDROCHLORIDE 150 MG/1
150 TABLET, EXTENDED RELEASE ORAL 2 TIMES DAILY
Status: DISCONTINUED | OUTPATIENT
Start: 2025-06-06 | End: 2025-06-11 | Stop reason: HOSPADM

## 2025-06-06 RX ORDER — PHENOBARBITAL SODIUM 65 MG/ML
32.5 INJECTION, SOLUTION INTRAMUSCULAR; INTRAVENOUS EVERY 6 HOURS
Status: COMPLETED | OUTPATIENT
Start: 2025-06-06 | End: 2025-06-07

## 2025-06-06 RX ORDER — SODIUM CHLORIDE 0.9 % (FLUSH) 0.9 %
5-40 SYRINGE (ML) INJECTION PRN
Status: DISCONTINUED | OUTPATIENT
Start: 2025-06-06 | End: 2025-06-09 | Stop reason: SDUPTHER

## 2025-06-06 RX ORDER — ACETAMINOPHEN 160 MG/5ML
650 SUSPENSION ORAL EVERY 6 HOURS PRN
Status: DISCONTINUED | OUTPATIENT
Start: 2025-06-06 | End: 2025-06-11 | Stop reason: HOSPADM

## 2025-06-06 RX ORDER — FOLIC ACID 1 MG/1
1 TABLET ORAL DAILY
Status: DISCONTINUED | OUTPATIENT
Start: 2025-06-06 | End: 2025-06-11 | Stop reason: HOSPADM

## 2025-06-06 RX ORDER — ISOSORBIDE MONONITRATE 30 MG/1
30 TABLET, EXTENDED RELEASE ORAL DAILY
Status: DISCONTINUED | OUTPATIENT
Start: 2025-06-07 | End: 2025-06-11 | Stop reason: HOSPADM

## 2025-06-06 RX ORDER — SODIUM CHLORIDE 0.9 % (FLUSH) 0.9 %
5-40 SYRINGE (ML) INJECTION EVERY 12 HOURS SCHEDULED
Status: DISCONTINUED | OUTPATIENT
Start: 2025-06-06 | End: 2025-06-11 | Stop reason: HOSPADM

## 2025-06-06 RX ORDER — PHENOBARBITAL SODIUM 65 MG/ML
65 INJECTION, SOLUTION INTRAMUSCULAR; INTRAVENOUS ONCE
Status: COMPLETED | OUTPATIENT
Start: 2025-06-06 | End: 2025-06-06

## 2025-06-06 RX ORDER — ONDANSETRON 2 MG/ML
4 INJECTION INTRAMUSCULAR; INTRAVENOUS EVERY 6 HOURS PRN
Status: DISCONTINUED | OUTPATIENT
Start: 2025-06-06 | End: 2025-06-11 | Stop reason: HOSPADM

## 2025-06-06 RX ORDER — SODIUM CHLORIDE, SODIUM LACTATE, POTASSIUM CHLORIDE, CALCIUM CHLORIDE 600; 310; 30; 20 MG/100ML; MG/100ML; MG/100ML; MG/100ML
INJECTION, SOLUTION INTRAVENOUS
Status: COMPLETED
Start: 2025-06-06 | End: 2025-06-06

## 2025-06-06 RX ORDER — ACETAMINOPHEN 650 MG/1
650 SUPPOSITORY RECTAL EVERY 6 HOURS PRN
Status: DISCONTINUED | OUTPATIENT
Start: 2025-06-06 | End: 2025-06-11 | Stop reason: HOSPADM

## 2025-06-06 RX ORDER — POLYETHYLENE GLYCOL 3350 17 G/17G
17 POWDER, FOR SOLUTION ORAL DAILY PRN
Status: DISCONTINUED | OUTPATIENT
Start: 2025-06-06 | End: 2025-06-11 | Stop reason: HOSPADM

## 2025-06-06 RX ORDER — ATORVASTATIN CALCIUM 40 MG/1
80 TABLET, FILM COATED ORAL NIGHTLY
Status: DISCONTINUED | OUTPATIENT
Start: 2025-06-07 | End: 2025-06-11 | Stop reason: HOSPADM

## 2025-06-06 RX ORDER — ARFORMOTEROL TARTRATE 15 UG/2ML
15 SOLUTION RESPIRATORY (INHALATION)
Status: DISCONTINUED | OUTPATIENT
Start: 2025-06-06 | End: 2025-06-11 | Stop reason: HOSPADM

## 2025-06-06 RX ORDER — SODIUM CHLORIDE, SODIUM LACTATE, POTASSIUM CHLORIDE, AND CALCIUM CHLORIDE .6; .31; .03; .02 G/100ML; G/100ML; G/100ML; G/100ML
1000 INJECTION, SOLUTION INTRAVENOUS ONCE
Status: COMPLETED | OUTPATIENT
Start: 2025-06-06 | End: 2025-06-06

## 2025-06-06 RX ORDER — PHENOBARBITAL SODIUM 65 MG/ML
32.5 INJECTION, SOLUTION INTRAMUSCULAR; INTRAVENOUS EVERY 12 HOURS
Status: ACTIVE | OUTPATIENT
Start: 2025-06-07 | End: 2025-06-08

## 2025-06-06 RX ORDER — PHENOBARBITAL SODIUM 65 MG/ML
16.2 INJECTION, SOLUTION INTRAMUSCULAR; INTRAVENOUS EVERY 12 HOURS
Status: DISCONTINUED | OUTPATIENT
Start: 2025-06-08 | End: 2025-06-06

## 2025-06-06 RX ORDER — BUDESONIDE 0.5 MG/2ML
0.5 INHALANT ORAL
Status: DISCONTINUED | OUTPATIENT
Start: 2025-06-06 | End: 2025-06-11 | Stop reason: HOSPADM

## 2025-06-06 RX ORDER — ONDANSETRON 4 MG/1
4 TABLET, ORALLY DISINTEGRATING ORAL EVERY 8 HOURS PRN
Status: DISCONTINUED | OUTPATIENT
Start: 2025-06-06 | End: 2025-06-11 | Stop reason: HOSPADM

## 2025-06-06 RX ORDER — PHENOBARBITAL SODIUM 65 MG/ML
32.5 INJECTION, SOLUTION INTRAMUSCULAR; INTRAVENOUS EVERY 12 HOURS
Status: DISCONTINUED | OUTPATIENT
Start: 2025-06-07 | End: 2025-06-06

## 2025-06-06 RX ORDER — SODIUM CHLORIDE, SODIUM LACTATE, POTASSIUM CHLORIDE, CALCIUM CHLORIDE 600; 310; 30; 20 MG/100ML; MG/100ML; MG/100ML; MG/100ML
INJECTION, SOLUTION INTRAVENOUS CONTINUOUS
Status: DISCONTINUED | OUTPATIENT
Start: 2025-06-06 | End: 2025-06-07 | Stop reason: SDUPTHER

## 2025-06-06 RX ORDER — PHENOBARBITAL SODIUM 65 MG/ML
16.2 INJECTION, SOLUTION INTRAMUSCULAR; INTRAVENOUS EVERY 6 HOURS PRN
Status: DISPENSED | OUTPATIENT
Start: 2025-06-08 | End: 2025-06-10

## 2025-06-06 RX ORDER — LEVALBUTEROL INHALATION SOLUTION 0.63 MG/3ML
0.63 SOLUTION RESPIRATORY (INHALATION) EVERY 6 HOURS PRN
Status: DISCONTINUED | OUTPATIENT
Start: 2025-06-06 | End: 2025-06-11 | Stop reason: HOSPADM

## 2025-06-06 RX ADMIN — SODIUM CHLORIDE, SODIUM LACTATE, POTASSIUM CHLORIDE, AND CALCIUM CHLORIDE 1000 ML: .6; .31; .03; .02 INJECTION, SOLUTION INTRAVENOUS at 16:14

## 2025-06-06 RX ADMIN — BUDESONIDE 500 MCG: 0.5 SUSPENSION RESPIRATORY (INHALATION) at 19:26

## 2025-06-06 RX ADMIN — PHENOBARBITAL SODIUM 65 MG: 65 INJECTION INTRAMUSCULAR; INTRAVENOUS at 16:24

## 2025-06-06 RX ADMIN — ARFORMOTEROL TARTRATE 15 MCG: 15 SOLUTION RESPIRATORY (INHALATION) at 19:26

## 2025-06-06 RX ADMIN — CEFTRIAXONE SODIUM 1000 MG: 1 INJECTION, POWDER, FOR SOLUTION INTRAMUSCULAR; INTRAVENOUS at 16:17

## 2025-06-06 RX ADMIN — PANTOPRAZOLE SODIUM 80 MG: 40 INJECTION, POWDER, FOR SOLUTION INTRAVENOUS at 16:15

## 2025-06-06 RX ADMIN — SODIUM CHLORIDE, PRESERVATIVE FREE 40 MG: 5 INJECTION INTRAVENOUS at 22:24

## 2025-06-06 RX ADMIN — OCTREOTIDE ACETATE 50 MCG/HR: 500 INJECTION, SOLUTION INTRAVENOUS; SUBCUTANEOUS at 20:58

## 2025-06-06 RX ADMIN — OCTREOTIDE ACETATE 50 MCG: 50 INJECTION, SOLUTION INTRAVENOUS; SUBCUTANEOUS at 20:54

## 2025-06-06 RX ADMIN — PHENOBARBITAL SODIUM 32.5 MG: 65 INJECTION INTRAMUSCULAR; INTRAVENOUS at 22:24

## 2025-06-06 RX ADMIN — Medication 100 MG: at 22:24

## 2025-06-06 ASSESSMENT — LIFESTYLE VARIABLES
HOW OFTEN DO YOU HAVE A DRINK CONTAINING ALCOHOL: MONTHLY OR LESS
HOW MANY STANDARD DRINKS CONTAINING ALCOHOL DO YOU HAVE ON A TYPICAL DAY: 1 OR 2

## 2025-06-06 NOTE — CONSENT
Informed Consent for Blood Component Transfusion Note    I have discussed with the patient the rationale for blood component transfusion; its benefits in treating or preventing fatigue, organ damage, or death; and its risk which includes mild transfusion reactions, rare risk of blood borne infection, or more serious but rare reactions. I have discussed the alternatives to transfusion, including the risk and consequences of not receiving transfusion. The patient had an opportunity to ask questions and had agreed to proceed with transfusion of blood components.    Electronically signed by Maida Whelan DO on 6/6/25 at 3:45 PM EDT

## 2025-06-06 NOTE — H&P
[facility] Hospitalist Group   History and Physical    CHIEF COMPLAINT:  Rectal bleed    History of Present Illness:  Bobby Dubose is a 64 y.o. male with a history of alcohol abuse, COPD, HTN & HLD who presents with GI Bleeding (Black stools. Recent DVT surgery. Patient dizziness, increased weakness and blurred vision)  On 6/4, pt underwent right superficial femoral artery lithotripsy, right SFA angioplasty, right popliteal artery angioplasty with drug-coated balloon. He was started on Plavix and has also been taking ibuprofen for the past few days. Pt reports ~6 beers/day, last alcohol was 6/3.  Pt reports multiple episodes of melena since last night, now with dyspnea & lightheadedness.   Tachycardic & initially hypotensive in ER, responsive to fluids but remains soft. Hgb 9.3, was 15.3 on 6/4. Lactic acid 3.5. Troponin 95. Also found to have ELIF.  While in ER, pt received 1L IVF bolus, 80mg protonix, ceftriaxone & phenobarbital and 2 units PRBC.  GI consulted from ER.     WORK UP SINCE ARRIVAL:  Results for orders placed or performed during the hospital encounter of 06/06/25   CBC with Auto Differential   Result Value Ref Range    WBC 7.6 4.5 - 11.5 k/uL    RBC 2.82 (L) 3.80 - 5.80 m/uL    Hemoglobin 9.3 (L) 12.5 - 16.5 g/dL    Hematocrit 24.8 (L) 37.0 - 54.0 %    MCV 87.9 80.0 - 99.9 fL    MCH 33.0 26.0 - 35.0 pg    MCHC 37.5 (H) 32.0 - 34.5 g/dL    RDW 11.9 11.5 - 15.0 %    Platelets 286 130 - 450 k/uL    MPV 10.2 7.0 - 12.0 fL    Neutrophils % 82 (H) 43.0 - 80.0 %    Lymphocytes % 10 (L) 20.0 - 42.0 %    Monocytes % 8 2.0 - 12.0 %    Eosinophils % 0 0 - 6 %    Basophils % 0 0.0 - 2.0 %    Immature Granulocytes % 1 0.0 - 5.0 %    Neutrophils Absolute 6.23 1.80 - 7.30 k/uL    Lymphocytes Absolute 0.74 (L) 1.50 - 4.00 k/uL    Monocytes Absolute 0.57 0.10 - 0.95 k/uL    Eosinophils Absolute 0.02 (L) 0.05 - 0.50 k/uL    Basophils Absolute 0.02 0.00 - 0.20 k/uL    Immature Granulocytes Absolute

## 2025-06-06 NOTE — ED NOTES
Assumed report from HAO Basurto at 1900. Patient lying in bed at this time. No complaints noted. PRBCs running at 75 mL/hr.

## 2025-06-06 NOTE — TELEPHONE ENCOUNTER
Bobby called and he could hardly talk he was short of breath. He complained he had diarrhea, his stool was black and he was vomiting. I advised him to go to the emergency room . He said he was closer to [facility] . He would go there.   Never smoker

## 2025-06-06 NOTE — ED PROVIDER NOTES
64-year-old male with peripheral vascular disease, COPD and alcohol use who is status post 6/4/2025 he underwent Right superficial femoral artery lithotripsy with shockwave 6 x 80 mm balloon, 9 total cycles of 40 pulses, right SFA angioplasty 6 x 250 mm Admiral Impact drug-coated balloon, right popliteal artery angioplasty 5 x 150 mm Admiral Impact drug-coated balloon according to postop note on 6/4/2025 patient was given 150 mg of Plavix and was told to continue Plavix daily at home.  Patient has also been taking Advil and ibuprofen for pain control for the past few days.  He comes emerged part today after he had been having multiple episodes of dark tarry stools and which caused him to feel short of breath and lightheadedness.  He did not fall or hit his head.  Denies any chest pain headache or vision changes.  Patient states that he drinks daily his last drink was last night and when he was watching the baseball game denies having alcohol the          Review of Systems   Pertinent see HPI  Physical Exam  HENT:      Head:      Comments: Telangiectasias of the face     Mouth/Throat:      Mouth: Mucous membranes are moist.   Eyes:      Comments: Bilateral conjunctival pallor   Cardiovascular:      Rate and Rhythm: Regular rhythm. Tachycardia present.   Pulmonary:      Effort: Pulmonary effort is normal. No respiratory distress.      Breath sounds: No wheezing.   Abdominal:      Palpations: Abdomen is soft.      Tenderness: There is no abdominal tenderness. There is no guarding or rebound.      Hernia: No hernia is present.   Musculoskeletal:         General: No deformity or signs of injury.      Right lower leg: No edema.      Left lower leg: No edema.   Skin:     Capillary Refill: Capillary refill takes more than 3 seconds.      Coloration: Skin is pale. Skin is not jaundiced.          Procedures     EKG:  This EKG is signed by emergency department physician.    Rate: 120  Rhythm: Sinus  AXIS: Rate  ST Changes: No

## 2025-06-07 ENCOUNTER — APPOINTMENT (OUTPATIENT)
Dept: CT IMAGING | Age: 65
DRG: 378 | End: 2025-06-07
Payer: COMMERCIAL

## 2025-06-07 ENCOUNTER — ANESTHESIA EVENT (OUTPATIENT)
Dept: ENDOSCOPY | Age: 65
End: 2025-06-07
Payer: COMMERCIAL

## 2025-06-07 LAB
ALBUMIN SERPL-MCNC: 3.7 G/DL (ref 3.5–5.2)
ALP SERPL-CCNC: 75 U/L (ref 40–129)
ALT SERPL-CCNC: 27 U/L (ref 0–40)
AMMONIA PLAS-SCNC: 19 UMOL/L (ref 16–60)
ANION GAP SERPL CALCULATED.3IONS-SCNC: 13 MMOL/L (ref 7–16)
APAP SERPL-MCNC: <5 UG/ML (ref 10–30)
AST SERPL-CCNC: 29 U/L (ref 0–39)
BASOPHILS # BLD: 0.02 K/UL (ref 0–0.2)
BASOPHILS NFR BLD: 0 % (ref 0–2)
BILIRUB SERPL-MCNC: 0.5 MG/DL (ref 0–1.2)
BUN SERPL-MCNC: 33 MG/DL (ref 6–23)
CALCIUM SERPL-MCNC: 8.8 MG/DL (ref 8.6–10.2)
CHLORIDE SERPL-SCNC: 99 MMOL/L (ref 98–107)
CO2 SERPL-SCNC: 19 MMOL/L (ref 22–29)
CREAT SERPL-MCNC: 1.1 MG/DL (ref 0.7–1.2)
EOSINOPHIL # BLD: 0.06 K/UL (ref 0.05–0.5)
EOSINOPHILS RELATIVE PERCENT: 1 % (ref 0–6)
ERYTHROCYTE [DISTWIDTH] IN BLOOD BY AUTOMATED COUNT: 12.4 % (ref 11.5–15)
ETHANOLAMINE SERPL-MCNC: <10 MG/DL (ref 0–0.08)
FERRITIN SERPL-MCNC: 155 NG/ML
FOLATE SERPL-MCNC: 31.3 NG/ML (ref 4.6–34.8)
GFR, ESTIMATED: 78 ML/MIN/1.73M2
GGT SERPL-CCNC: 71 U/L (ref 8–61)
GLUCOSE SERPL-MCNC: 152 MG/DL (ref 74–99)
HCT VFR BLD AUTO: 26.3 % (ref 37–54)
HCT VFR BLD AUTO: 26.5 % (ref 37–54)
HCT VFR BLD AUTO: 26.7 % (ref 37–54)
HCT VFR BLD AUTO: 27.3 % (ref 37–54)
HGB BLD-MCNC: 9.2 G/DL (ref 12.5–16.5)
HGB BLD-MCNC: 9.4 G/DL (ref 12.5–16.5)
HGB BLD-MCNC: 9.5 G/DL (ref 12.5–16.5)
HGB BLD-MCNC: 9.6 G/DL (ref 12.5–16.5)
IMM GRANULOCYTES # BLD AUTO: <0.03 K/UL (ref 0–0.58)
IMM GRANULOCYTES NFR BLD: 0 % (ref 0–5)
IRON SATN MFR SERPL: 44 % (ref 20–55)
IRON SERPL-MCNC: 102 UG/DL (ref 61–157)
LYMPHOCYTES NFR BLD: 1.09 K/UL (ref 1.5–4)
LYMPHOCYTES RELATIVE PERCENT: 19 % (ref 20–42)
MCH RBC QN AUTO: 31.3 PG (ref 26–35)
MCHC RBC AUTO-ENTMCNC: 35.2 G/DL (ref 32–34.5)
MCV RBC AUTO: 89 FL (ref 80–99.9)
MONOCYTES NFR BLD: 0.49 K/UL (ref 0.1–0.95)
MONOCYTES NFR BLD: 9 % (ref 2–12)
NEUTROPHILS NFR BLD: 70 % (ref 43–80)
NEUTS SEG NFR BLD: 4 K/UL (ref 1.8–7.3)
PLATELET # BLD AUTO: 193 K/UL (ref 130–450)
PMV BLD AUTO: 9.7 FL (ref 7–12)
POTASSIUM SERPL-SCNC: 4 MMOL/L (ref 3.5–5)
PROT SERPL-MCNC: 5.8 G/DL (ref 6.4–8.3)
RBC # BLD AUTO: 3 M/UL (ref 3.8–5.8)
SALICYLATES SERPL-MCNC: <0.3 MG/DL (ref 0–30)
SODIUM SERPL-SCNC: 131 MMOL/L (ref 132–146)
TIBC SERPL-MCNC: 230 UG/DL (ref 250–450)
TOXIC TRICYCLIC SC,BLOOD: NEGATIVE
TSH SERPL DL<=0.05 MIU/L-ACNC: 0.6 UIU/ML (ref 0.27–4.2)
VIT B12 SERPL-MCNC: 453 PG/ML (ref 232–1245)
WBC OTHER # BLD: 5.7 K/UL (ref 4.5–11.5)

## 2025-06-07 PROCEDURE — 84443 ASSAY THYROID STIM HORMONE: CPT

## 2025-06-07 PROCEDURE — 84425 ASSAY OF VITAMIN B-1: CPT

## 2025-06-07 PROCEDURE — 82140 ASSAY OF AMMONIA: CPT

## 2025-06-07 PROCEDURE — 80053 COMPREHEN METABOLIC PANEL: CPT

## 2025-06-07 PROCEDURE — 94640 AIRWAY INHALATION TREATMENT: CPT

## 2025-06-07 PROCEDURE — 74174 CTA ABD&PLVS W/CONTRAST: CPT

## 2025-06-07 PROCEDURE — 82977 ASSAY OF GGT: CPT

## 2025-06-07 PROCEDURE — 85014 HEMATOCRIT: CPT

## 2025-06-07 PROCEDURE — 2500000003 HC RX 250 WO HCPCS: Performed by: NURSE PRACTITIONER

## 2025-06-07 PROCEDURE — 85018 HEMOGLOBIN: CPT

## 2025-06-07 PROCEDURE — 85025 COMPLETE CBC W/AUTO DIFF WBC: CPT

## 2025-06-07 PROCEDURE — 6360000002 HC RX W HCPCS: Performed by: HOSPITALIST

## 2025-06-07 PROCEDURE — 36415 COLL VENOUS BLD VENIPUNCTURE: CPT

## 2025-06-07 PROCEDURE — 6360000002 HC RX W HCPCS: Performed by: FAMILY MEDICINE

## 2025-06-07 PROCEDURE — 2500000003 HC RX 250 WO HCPCS: Performed by: HOSPITALIST

## 2025-06-07 PROCEDURE — 6370000000 HC RX 637 (ALT 250 FOR IP): Performed by: NURSE PRACTITIONER

## 2025-06-07 PROCEDURE — 6360000002 HC RX W HCPCS: Performed by: NURSE PRACTITIONER

## 2025-06-07 PROCEDURE — 2580000003 HC RX 258: Performed by: NURSE PRACTITIONER

## 2025-06-07 PROCEDURE — 6370000000 HC RX 637 (ALT 250 FOR IP): Performed by: FAMILY MEDICINE

## 2025-06-07 PROCEDURE — 2580000003 HC RX 258: Performed by: HOSPITALIST

## 2025-06-07 PROCEDURE — 6360000004 HC RX CONTRAST MEDICATION: Performed by: RADIOLOGY

## 2025-06-07 PROCEDURE — 99232 SBSQ HOSP IP/OBS MODERATE 35: CPT | Performed by: FAMILY MEDICINE

## 2025-06-07 PROCEDURE — 2060000000 HC ICU INTERMEDIATE R&B

## 2025-06-07 PROCEDURE — 2580000003 HC RX 258: Performed by: FAMILY MEDICINE

## 2025-06-07 RX ORDER — THIAMINE HYDROCHLORIDE 100 MG/ML
100 INJECTION, SOLUTION INTRAMUSCULAR; INTRAVENOUS 2 TIMES DAILY
Status: DISCONTINUED | OUTPATIENT
Start: 2025-06-07 | End: 2025-06-11 | Stop reason: HOSPADM

## 2025-06-07 RX ORDER — SODIUM CHLORIDE, SODIUM LACTATE, POTASSIUM CHLORIDE, CALCIUM CHLORIDE 600; 310; 30; 20 MG/100ML; MG/100ML; MG/100ML; MG/100ML
INJECTION, SOLUTION INTRAVENOUS CONTINUOUS
Status: DISCONTINUED | OUTPATIENT
Start: 2025-06-07 | End: 2025-06-11 | Stop reason: HOSPADM

## 2025-06-07 RX ORDER — IOPAMIDOL 755 MG/ML
75 INJECTION, SOLUTION INTRAVASCULAR
Status: COMPLETED | OUTPATIENT
Start: 2025-06-07 | End: 2025-06-07

## 2025-06-07 RX ADMIN — PHENOBARBITAL SODIUM 32.5 MG: 65 INJECTION INTRAMUSCULAR; INTRAVENOUS at 22:23

## 2025-06-07 RX ADMIN — THIAMINE HYDROCHLORIDE 100 MG: 100 INJECTION, SOLUTION INTRAMUSCULAR; INTRAVENOUS at 20:59

## 2025-06-07 RX ADMIN — ARFORMOTEROL TARTRATE 15 MCG: 15 SOLUTION RESPIRATORY (INHALATION) at 20:12

## 2025-06-07 RX ADMIN — SODIUM CHLORIDE, SODIUM LACTATE, POTASSIUM CHLORIDE, AND CALCIUM CHLORIDE: .6; .31; .03; .02 INJECTION, SOLUTION INTRAVENOUS at 09:06

## 2025-06-07 RX ADMIN — SERTRALINE 100 MG: 50 TABLET, FILM COATED ORAL at 16:32

## 2025-06-07 RX ADMIN — ATORVASTATIN CALCIUM 80 MG: 40 TABLET, FILM COATED ORAL at 20:59

## 2025-06-07 RX ADMIN — BUDESONIDE 500 MCG: 0.5 SUSPENSION RESPIRATORY (INHALATION) at 07:21

## 2025-06-07 RX ADMIN — SODIUM CHLORIDE, PRESERVATIVE FREE 40 MG: 5 INJECTION INTRAVENOUS at 04:16

## 2025-06-07 RX ADMIN — PHENOBARBITAL SODIUM 32.5 MG: 65 INJECTION INTRAMUSCULAR; INTRAVENOUS at 11:43

## 2025-06-07 RX ADMIN — PHENOBARBITAL SODIUM 32.5 MG: 65 INJECTION INTRAMUSCULAR; INTRAVENOUS at 01:07

## 2025-06-07 RX ADMIN — BUDESONIDE 500 MCG: 0.5 SUSPENSION RESPIRATORY (INHALATION) at 20:12

## 2025-06-07 RX ADMIN — SODIUM CHLORIDE, SODIUM LACTATE, POTASSIUM CHLORIDE, AND CALCIUM CHLORIDE: .6; .31; .03; .02 INJECTION, SOLUTION INTRAVENOUS at 18:14

## 2025-06-07 RX ADMIN — SODIUM CHLORIDE, SODIUM LACTATE, POTASSIUM CHLORIDE, AND CALCIUM CHLORIDE: .6; .31; .03; .02 INJECTION, SOLUTION INTRAVENOUS at 01:13

## 2025-06-07 RX ADMIN — IOPAMIDOL 75 ML: 755 INJECTION, SOLUTION INTRAVENOUS at 11:05

## 2025-06-07 RX ADMIN — THIAMINE HYDROCHLORIDE 100 MG: 100 INJECTION, SOLUTION INTRAMUSCULAR; INTRAVENOUS at 09:07

## 2025-06-07 RX ADMIN — BUPROPION HYDROCHLORIDE 150 MG: 150 TABLET, EXTENDED RELEASE ORAL at 20:59

## 2025-06-07 RX ADMIN — BUPROPION HYDROCHLORIDE 150 MG: 150 TABLET, EXTENDED RELEASE ORAL at 16:33

## 2025-06-07 RX ADMIN — OCTREOTIDE ACETATE 50 MCG/HR: 500 INJECTION, SOLUTION INTRAVENOUS; SUBCUTANEOUS at 18:15

## 2025-06-07 RX ADMIN — PHENOBARBITAL SODIUM 32.5 MG: 65 INJECTION INTRAMUSCULAR; INTRAVENOUS at 18:16

## 2025-06-07 RX ADMIN — OCTREOTIDE ACETATE 50 MCG/HR: 500 INJECTION, SOLUTION INTRAVENOUS; SUBCUTANEOUS at 09:56

## 2025-06-07 RX ADMIN — SODIUM CHLORIDE, PRESERVATIVE FREE 10 ML: 5 INJECTION INTRAVENOUS at 09:09

## 2025-06-07 RX ADMIN — PHENOBARBITAL SODIUM 32.5 MG: 65 INJECTION INTRAMUSCULAR; INTRAVENOUS at 04:16

## 2025-06-07 RX ADMIN — SODIUM CHLORIDE, PRESERVATIVE FREE 40 MG: 5 INJECTION INTRAVENOUS at 18:16

## 2025-06-07 RX ADMIN — SODIUM CHLORIDE, PRESERVATIVE FREE 40 MG: 5 INJECTION INTRAVENOUS at 11:43

## 2025-06-07 RX ADMIN — ARFORMOTEROL TARTRATE 15 MCG: 15 SOLUTION RESPIRATORY (INHALATION) at 07:21

## 2025-06-07 RX ADMIN — FOLIC ACID 1 MG: 1 TABLET ORAL at 16:34

## 2025-06-07 RX ADMIN — SODIUM CHLORIDE, PRESERVATIVE FREE 40 MG: 5 INJECTION INTRAVENOUS at 22:23

## 2025-06-07 NOTE — CONSULTS
Name:  Bobby Dubose  :  1960  MRN:  [id number]  Room:  0537/0537-01  DOS:  2025    [facility]  [facility]  Dr. Elvira Rand M.D.  Dr. Sebastian Alcantara M.D.  Dr. Dutch Small D.O.  Dr. Loy Ames M.D.  Dr. Shlomo Romo D.O.     -NP Consult Note-    PCP:  Rebel Rubio MD  Admitting Physician:  Aidee Mchugh DO  Consultants: GI  Chief Complaint:    Chief Complaint   Patient presents with    GI Bleeding     Black stools. Recent DVT surgery. Patient dizziness, increased weakness and blurred vision     Reason for Consult: Upper GI bleed    History of Present Illness  Bobby Dubose is a 64 y.o. male patient on consult for upper GI bleed.  Patient with history of peripheral artery disease.  On 2025, patient underwent abdominal aortogram with bilateral lower extremity runoff, right lower extremity angiogram, right superficial femoral artery lithotripsy, right SFA angioplasty, right popliteal artery angioplasty.  From Thursday night into Friday morning, patient developed black, watery stool.  He denies any hematochezia.  He progressed to have worsening shortness of breath.  He denied any chest pain.  Reported dizziness, worse with postural changes.  He reports subjective fever with sweats.  Reports some chills.  Patient also had some nausea and vomiting.  Emesis consisting of undigested liquid.  No hematemesis or emesis of coffee-ground material.  At baseline, patient reports bowel movements daily.  Stools at baseline are brown and formed.    PMH: Peripheral artery disease, hypertension, dyslipidemia, COPD.    PSH: Vascular surgery as described above.  Ankle fracture surgery, back surgery, rotator cuff surgery, tonsillectomy.  No prior EGD.  Colonoscopy most recently about 3 years ago with polypectomy per patient report.    Family history: Mother with CAD.  Father is living and in fairly good health at age 89.  Sister with breast cancer.  Maternal grandfather with

## 2025-06-08 ENCOUNTER — ANESTHESIA (OUTPATIENT)
Dept: ENDOSCOPY | Age: 65
End: 2025-06-08
Payer: COMMERCIAL

## 2025-06-08 LAB
ALBUMIN SERPL-MCNC: 3.7 G/DL (ref 3.5–5.2)
ALP SERPL-CCNC: 82 U/L (ref 40–129)
ALT SERPL-CCNC: 35 U/L (ref 0–40)
ANION GAP SERPL CALCULATED.3IONS-SCNC: 13 MMOL/L (ref 7–16)
AST SERPL-CCNC: 34 U/L (ref 0–39)
BASOPHILS # BLD: 0.02 K/UL (ref 0–0.2)
BASOPHILS NFR BLD: 0 % (ref 0–2)
BILIRUB SERPL-MCNC: 0.5 MG/DL (ref 0–1.2)
BUN SERPL-MCNC: 12 MG/DL (ref 6–23)
CALCIUM SERPL-MCNC: 8.6 MG/DL (ref 8.6–10.2)
CHLORIDE SERPL-SCNC: 100 MMOL/L (ref 98–107)
CO2 SERPL-SCNC: 22 MMOL/L (ref 22–29)
CREAT SERPL-MCNC: 0.8 MG/DL (ref 0.7–1.2)
EOSINOPHIL # BLD: 0.16 K/UL (ref 0.05–0.5)
EOSINOPHILS RELATIVE PERCENT: 3 % (ref 0–6)
ERYTHROCYTE [DISTWIDTH] IN BLOOD BY AUTOMATED COUNT: 12.2 % (ref 11.5–15)
GFR, ESTIMATED: >90 ML/MIN/1.73M2
GLUCOSE SERPL-MCNC: 154 MG/DL (ref 74–99)
HCT VFR BLD AUTO: 25.6 % (ref 37–54)
HCT VFR BLD AUTO: 26.2 % (ref 37–54)
HCT VFR BLD AUTO: 27.8 % (ref 37–54)
HCT VFR BLD AUTO: 28.4 % (ref 37–54)
HGB BLD-MCNC: 9 G/DL (ref 12.5–16.5)
HGB BLD-MCNC: 9.2 G/DL (ref 12.5–16.5)
HGB BLD-MCNC: 9.5 G/DL (ref 12.5–16.5)
HGB BLD-MCNC: 9.9 G/DL (ref 12.5–16.5)
IMM GRANULOCYTES # BLD AUTO: <0.03 K/UL (ref 0–0.58)
IMM GRANULOCYTES NFR BLD: 0 % (ref 0–5)
LYMPHOCYTES NFR BLD: 0.61 K/UL (ref 1.5–4)
LYMPHOCYTES RELATIVE PERCENT: 10 % (ref 20–42)
MCH RBC QN AUTO: 32.4 PG (ref 26–35)
MCHC RBC AUTO-ENTMCNC: 34.9 G/DL (ref 32–34.5)
MCV RBC AUTO: 92.8 FL (ref 80–99.9)
MONOCYTES NFR BLD: 0.49 K/UL (ref 0.1–0.95)
MONOCYTES NFR BLD: 8 % (ref 2–12)
NEUTROPHILS NFR BLD: 80 % (ref 43–80)
NEUTS SEG NFR BLD: 5.05 K/UL (ref 1.8–7.3)
PLATELET # BLD AUTO: 211 K/UL (ref 130–450)
PMV BLD AUTO: 9.5 FL (ref 7–12)
POTASSIUM SERPL-SCNC: 3.9 MMOL/L (ref 3.5–5)
PROT SERPL-MCNC: 6 G/DL (ref 6.4–8.3)
RBC # BLD AUTO: 3.06 M/UL (ref 3.8–5.8)
SODIUM SERPL-SCNC: 135 MMOL/L (ref 132–146)
WBC OTHER # BLD: 6.4 K/UL (ref 4.5–11.5)

## 2025-06-08 PROCEDURE — 2580000003 HC RX 258: Performed by: FAMILY MEDICINE

## 2025-06-08 PROCEDURE — 94640 AIRWAY INHALATION TREATMENT: CPT

## 2025-06-08 PROCEDURE — 0DB58ZX EXCISION OF ESOPHAGUS, VIA NATURAL OR ARTIFICIAL OPENING ENDOSCOPIC, DIAGNOSTIC: ICD-10-PCS | Performed by: INTERNAL MEDICINE

## 2025-06-08 PROCEDURE — 6360000002 HC RX W HCPCS: Performed by: HOSPITALIST

## 2025-06-08 PROCEDURE — 36415 COLL VENOUS BLD VENIPUNCTURE: CPT

## 2025-06-08 PROCEDURE — 85018 HEMOGLOBIN: CPT

## 2025-06-08 PROCEDURE — 2060000000 HC ICU INTERMEDIATE R&B

## 2025-06-08 PROCEDURE — 6370000000 HC RX 637 (ALT 250 FOR IP): Performed by: FAMILY MEDICINE

## 2025-06-08 PROCEDURE — 2580000003 HC RX 258: Performed by: HOSPITALIST

## 2025-06-08 PROCEDURE — 3700000000 HC ANESTHESIA ATTENDED CARE: Performed by: INTERNAL MEDICINE

## 2025-06-08 PROCEDURE — 6360000002 HC RX W HCPCS: Performed by: NURSE ANESTHETIST, CERTIFIED REGISTERED

## 2025-06-08 PROCEDURE — 2709999900 HC NON-CHARGEABLE SUPPLY: Performed by: INTERNAL MEDICINE

## 2025-06-08 PROCEDURE — 7100000010 HC PHASE II RECOVERY - FIRST 15 MIN: Performed by: INTERNAL MEDICINE

## 2025-06-08 PROCEDURE — 80053 COMPREHEN METABOLIC PANEL: CPT

## 2025-06-08 PROCEDURE — 85014 HEMATOCRIT: CPT

## 2025-06-08 PROCEDURE — 6370000000 HC RX 637 (ALT 250 FOR IP): Performed by: NURSE PRACTITIONER

## 2025-06-08 PROCEDURE — 6360000002 HC RX W HCPCS: Performed by: NURSE PRACTITIONER

## 2025-06-08 PROCEDURE — 3700000001 HC ADD 15 MINUTES (ANESTHESIA): Performed by: INTERNAL MEDICINE

## 2025-06-08 PROCEDURE — 6360000002 HC RX W HCPCS: Performed by: FAMILY MEDICINE

## 2025-06-08 PROCEDURE — 3609012400 HC EGD TRANSORAL BIOPSY SINGLE/MULTIPLE: Performed by: INTERNAL MEDICINE

## 2025-06-08 PROCEDURE — 6370000000 HC RX 637 (ALT 250 FOR IP): Performed by: INTERNAL MEDICINE

## 2025-06-08 PROCEDURE — 85025 COMPLETE CBC W/AUTO DIFF WBC: CPT

## 2025-06-08 PROCEDURE — 88305 TISSUE EXAM BY PATHOLOGIST: CPT

## 2025-06-08 PROCEDURE — 7100000011 HC PHASE II RECOVERY - ADDTL 15 MIN: Performed by: INTERNAL MEDICINE

## 2025-06-08 PROCEDURE — 2500000003 HC RX 250 WO HCPCS: Performed by: HOSPITALIST

## 2025-06-08 PROCEDURE — 0DB68ZX EXCISION OF STOMACH, VIA NATURAL OR ARTIFICIAL OPENING ENDOSCOPIC, DIAGNOSTIC: ICD-10-PCS | Performed by: INTERNAL MEDICINE

## 2025-06-08 PROCEDURE — 2500000003 HC RX 250 WO HCPCS: Performed by: NURSE PRACTITIONER

## 2025-06-08 PROCEDURE — 99232 SBSQ HOSP IP/OBS MODERATE 35: CPT | Performed by: INTERNAL MEDICINE

## 2025-06-08 RX ORDER — PANTOPRAZOLE SODIUM 40 MG/1
40 TABLET, DELAYED RELEASE ORAL
Status: CANCELLED | OUTPATIENT
Start: 2025-06-08

## 2025-06-08 RX ORDER — PROPOFOL 10 MG/ML
INJECTION, EMULSION INTRAVENOUS
Status: DISCONTINUED | OUTPATIENT
Start: 2025-06-08 | End: 2025-06-08 | Stop reason: SDUPTHER

## 2025-06-08 RX ORDER — METOPROLOL TARTRATE 50 MG
50 TABLET ORAL DAILY
Status: DISCONTINUED | OUTPATIENT
Start: 2025-06-08 | End: 2025-06-11 | Stop reason: HOSPADM

## 2025-06-08 RX ADMIN — METOPROLOL TARTRATE 50 MG: 50 TABLET, FILM COATED ORAL at 13:25

## 2025-06-08 RX ADMIN — ARFORMOTEROL TARTRATE 15 MCG: 15 SOLUTION RESPIRATORY (INHALATION) at 04:40

## 2025-06-08 RX ADMIN — SODIUM CHLORIDE, PRESERVATIVE FREE 40 MG: 5 INJECTION INTRAVENOUS at 05:19

## 2025-06-08 RX ADMIN — SODIUM CHLORIDE, PRESERVATIVE FREE 40 MG: 5 INJECTION INTRAVENOUS at 17:08

## 2025-06-08 RX ADMIN — PHENOBARBITAL SODIUM 16.2 MG: 65 INJECTION INTRAMUSCULAR; INTRAVENOUS at 20:51

## 2025-06-08 RX ADMIN — BUDESONIDE 500 MCG: 0.5 SUSPENSION RESPIRATORY (INHALATION) at 04:40

## 2025-06-08 RX ADMIN — ATORVASTATIN CALCIUM 80 MG: 40 TABLET, FILM COATED ORAL at 20:51

## 2025-06-08 RX ADMIN — BUPROPION HYDROCHLORIDE 150 MG: 150 TABLET, EXTENDED RELEASE ORAL at 10:56

## 2025-06-08 RX ADMIN — BUPROPION HYDROCHLORIDE 150 MG: 150 TABLET, EXTENDED RELEASE ORAL at 20:51

## 2025-06-08 RX ADMIN — SODIUM CHLORIDE, PRESERVATIVE FREE 10 ML: 5 INJECTION INTRAVENOUS at 10:59

## 2025-06-08 RX ADMIN — SODIUM CHLORIDE, PRESERVATIVE FREE 40 MG: 5 INJECTION INTRAVENOUS at 20:51

## 2025-06-08 RX ADMIN — ARFORMOTEROL TARTRATE 15 MCG: 15 SOLUTION RESPIRATORY (INHALATION) at 18:31

## 2025-06-08 RX ADMIN — OCTREOTIDE ACETATE 50 MCG/HR: 500 INJECTION, SOLUTION INTRAVENOUS; SUBCUTANEOUS at 11:03

## 2025-06-08 RX ADMIN — PROPOFOL 100 MG: 10 INJECTION, EMULSION INTRAVENOUS at 08:45

## 2025-06-08 RX ADMIN — PROPOFOL 50 MG: 10 INJECTION, EMULSION INTRAVENOUS at 08:47

## 2025-06-08 RX ADMIN — THIAMINE HYDROCHLORIDE 100 MG: 100 INJECTION, SOLUTION INTRAMUSCULAR; INTRAVENOUS at 20:51

## 2025-06-08 RX ADMIN — SODIUM CHLORIDE, SODIUM LACTATE, POTASSIUM CHLORIDE, AND CALCIUM CHLORIDE: .6; .31; .03; .02 INJECTION, SOLUTION INTRAVENOUS at 13:27

## 2025-06-08 RX ADMIN — LEVALBUTEROL HYDROCHLORIDE 0.63 MG: 0.63 SOLUTION RESPIRATORY (INHALATION) at 18:31

## 2025-06-08 RX ADMIN — LEVALBUTEROL HYDROCHLORIDE 0.63 MG: 0.63 SOLUTION RESPIRATORY (INHALATION) at 04:41

## 2025-06-08 RX ADMIN — FOLIC ACID 1 MG: 1 TABLET ORAL at 10:56

## 2025-06-08 RX ADMIN — BUDESONIDE 500 MCG: 0.5 SUSPENSION RESPIRATORY (INHALATION) at 18:31

## 2025-06-08 RX ADMIN — SODIUM CHLORIDE, PRESERVATIVE FREE 10 ML: 5 INJECTION INTRAVENOUS at 11:09

## 2025-06-08 RX ADMIN — SERTRALINE 100 MG: 50 TABLET, FILM COATED ORAL at 10:56

## 2025-06-08 RX ADMIN — THIAMINE HYDROCHLORIDE 100 MG: 100 INJECTION, SOLUTION INTRAMUSCULAR; INTRAVENOUS at 10:57

## 2025-06-08 ASSESSMENT — PAIN - FUNCTIONAL ASSESSMENT: PAIN_FUNCTIONAL_ASSESSMENT: ADULT NONVERBAL PAIN SCALE (NPVS)

## 2025-06-08 NOTE — OP NOTE
Operative Note      Patient: Bobby Dubose  YOB: 1960  MRN: [id number]    Date of Procedure: 6/8/2025    Pre-Op Diagnosis Codes:      * Gastrointestinal hemorrhage, unspecified gastrointestinal hemorrhage type [K92.2]    Post-Op Diagnosis:  Gastritis , mild duodenitis, GERD w possible Menard's        Procedure(s):  ESOPHAGOGASTRODUODENOSCOPY    Surgeon(s):  Sebastian Alcantara MD    Assistant:   Surgical Assistant: Cammie Gonzalez RN    Anesthesia: Monitor Anesthesia Care    Estimated Blood Loss (mL): none    Complications: None    Specimens:   ID Type Source Tests Collected by Time Destination   1 :  Gastric Gastric  Sebastian Alcantara MD 6/8/2025 0849        Implants:  * No implants in log *      Drains:   NG/OG/NJ/NE Tube Nasogastric Left nostril (Active)   Surrounding Skin Clean, dry & intact 06/07/25 2117   Securement device Tape 06/07/25 2117   Status Clamped 06/07/25 2117   Placement Verified X-Ray (Initial) 06/06/25 2233   NG/OG/NJ/NE External Measurement (cm) 59 cm 06/07/25 2117   Drainage Appearance Tan;White 06/07/25 0930   Output (mL) 200 ml 06/07/25 1844         Detailed Description of Procedure:  EGD w biopsies  Indication GI bleeding    Sedation  MAC    Endoscope was advanced easily through mouth to second portion of duodenum      Oropharynx views are limited but grossly normal.    Esophagus:   GEJ 37 2 cm sliding hiatal hernia, possible 3 cm Menard's biopsied.  No active bleeding or lesions w bleeding potential    Stomach:   Some NG trauma, mild gastritis, biopsied.  No blood, old or fresh, no lesions w bleeding potential    Duodenum: Bulb w mild erythematous duodenitis, no ulcer, no blood seen    Second portion of duodenum is normal.    IMPRESSION AND PLAN: No clear etiology for GI bleed seen on EGD.  Consider colonoscopy in or outpatient.  Antiplatelet therapy per vascular as patient had recent vascular surgery.  PPI po (protonix 40 bid).  Diet depending on colonoscopy plans, will communicate

## 2025-06-09 LAB
ALBUMIN SERPL-MCNC: 3.6 G/DL (ref 3.5–5.2)
ALP SERPL-CCNC: 79 U/L (ref 40–129)
ALT SERPL-CCNC: 27 U/L (ref 0–40)
ANION GAP SERPL CALCULATED.3IONS-SCNC: 12 MMOL/L (ref 7–16)
AST SERPL-CCNC: 26 U/L (ref 0–39)
BASOPHILS # BLD: 0.04 K/UL (ref 0–0.2)
BASOPHILS NFR BLD: 1 % (ref 0–2)
BILIRUB SERPL-MCNC: 0.5 MG/DL (ref 0–1.2)
BUN SERPL-MCNC: 6 MG/DL (ref 6–23)
CALCIUM SERPL-MCNC: 8.6 MG/DL (ref 8.6–10.2)
CHLORIDE SERPL-SCNC: 95 MMOL/L (ref 98–107)
CO2 SERPL-SCNC: 23 MMOL/L (ref 22–29)
CREAT SERPL-MCNC: 0.8 MG/DL (ref 0.7–1.2)
EOSINOPHIL # BLD: 0.18 K/UL (ref 0.05–0.5)
EOSINOPHILS RELATIVE PERCENT: 3 % (ref 0–6)
ERYTHROCYTE [DISTWIDTH] IN BLOOD BY AUTOMATED COUNT: 12.1 % (ref 11.5–15)
GFR, ESTIMATED: >90 ML/MIN/1.73M2
GLUCOSE SERPL-MCNC: 120 MG/DL (ref 74–99)
HCT VFR BLD AUTO: 26.2 % (ref 37–54)
HCT VFR BLD AUTO: 26.4 % (ref 37–54)
HCT VFR BLD AUTO: 26.9 % (ref 37–54)
HGB BLD-MCNC: 9.1 G/DL (ref 12.5–16.5)
HGB BLD-MCNC: 9.3 G/DL (ref 12.5–16.5)
HGB BLD-MCNC: 9.3 G/DL (ref 12.5–16.5)
IMM GRANULOCYTES # BLD AUTO: <0.03 K/UL (ref 0–0.58)
IMM GRANULOCYTES NFR BLD: 0 % (ref 0–5)
LYMPHOCYTES NFR BLD: 0.91 K/UL (ref 1.5–4)
LYMPHOCYTES RELATIVE PERCENT: 13 % (ref 20–42)
MCH RBC QN AUTO: 31.7 PG (ref 26–35)
MCHC RBC AUTO-ENTMCNC: 34.5 G/DL (ref 32–34.5)
MCV RBC AUTO: 92 FL (ref 80–99.9)
MONOCYTES NFR BLD: 0.52 K/UL (ref 0.1–0.95)
MONOCYTES NFR BLD: 7 % (ref 2–12)
NEUTROPHILS NFR BLD: 76 % (ref 43–80)
NEUTS SEG NFR BLD: 5.39 K/UL (ref 1.8–7.3)
PLATELET # BLD AUTO: 203 K/UL (ref 130–450)
PMV BLD AUTO: 9.9 FL (ref 7–12)
POTASSIUM SERPL-SCNC: 3.9 MMOL/L (ref 3.5–5)
PROT SERPL-MCNC: 5.8 G/DL (ref 6.4–8.3)
RBC # BLD AUTO: 2.87 M/UL (ref 3.8–5.8)
SODIUM SERPL-SCNC: 130 MMOL/L (ref 132–146)
WBC OTHER # BLD: 7.1 K/UL (ref 4.5–11.5)

## 2025-06-09 PROCEDURE — 6370000000 HC RX 637 (ALT 250 FOR IP): Performed by: FAMILY MEDICINE

## 2025-06-09 PROCEDURE — 2500000003 HC RX 250 WO HCPCS: Performed by: NURSE PRACTITIONER

## 2025-06-09 PROCEDURE — 85018 HEMOGLOBIN: CPT

## 2025-06-09 PROCEDURE — 2500000003 HC RX 250 WO HCPCS: Performed by: HOSPITALIST

## 2025-06-09 PROCEDURE — 2060000000 HC ICU INTERMEDIATE R&B

## 2025-06-09 PROCEDURE — 6370000000 HC RX 637 (ALT 250 FOR IP): Performed by: INTERNAL MEDICINE

## 2025-06-09 PROCEDURE — 6370000000 HC RX 637 (ALT 250 FOR IP): Performed by: NURSE PRACTITIONER

## 2025-06-09 PROCEDURE — 94640 AIRWAY INHALATION TREATMENT: CPT

## 2025-06-09 PROCEDURE — 85014 HEMATOCRIT: CPT

## 2025-06-09 PROCEDURE — 6360000002 HC RX W HCPCS: Performed by: FAMILY MEDICINE

## 2025-06-09 PROCEDURE — 80053 COMPREHEN METABOLIC PANEL: CPT

## 2025-06-09 PROCEDURE — 36415 COLL VENOUS BLD VENIPUNCTURE: CPT

## 2025-06-09 PROCEDURE — 85025 COMPLETE CBC W/AUTO DIFF WBC: CPT

## 2025-06-09 PROCEDURE — 6360000002 HC RX W HCPCS: Performed by: NURSE PRACTITIONER

## 2025-06-09 PROCEDURE — 6370000000 HC RX 637 (ALT 250 FOR IP): Performed by: HOSPITALIST

## 2025-06-09 PROCEDURE — 6360000002 HC RX W HCPCS: Performed by: HOSPITALIST

## 2025-06-09 RX ORDER — POLYETHYLENE GLYCOL 3350, SODIUM CHLORIDE, SODIUM BICARBONATE, POTASSIUM CHLORIDE 420; 11.2; 5.72; 1.48 G/4L; G/4L; G/4L; G/4L
4000 POWDER, FOR SOLUTION ORAL ONCE
Status: DISCONTINUED | OUTPATIENT
Start: 2025-06-09 | End: 2025-06-09 | Stop reason: CLARIF

## 2025-06-09 RX ORDER — BISACODYL 5 MG/1
10 TABLET, DELAYED RELEASE ORAL ONCE
Status: COMPLETED | OUTPATIENT
Start: 2025-06-09 | End: 2025-06-09

## 2025-06-09 RX ADMIN — BUPROPION HYDROCHLORIDE 150 MG: 150 TABLET, EXTENDED RELEASE ORAL at 08:08

## 2025-06-09 RX ADMIN — SODIUM CHLORIDE, PRESERVATIVE FREE 10 ML: 5 INJECTION INTRAVENOUS at 21:30

## 2025-06-09 RX ADMIN — ARFORMOTEROL TARTRATE 15 MCG: 15 SOLUTION RESPIRATORY (INHALATION) at 16:44

## 2025-06-09 RX ADMIN — ATORVASTATIN CALCIUM 80 MG: 40 TABLET, FILM COATED ORAL at 21:29

## 2025-06-09 RX ADMIN — FOLIC ACID 1 MG: 1 TABLET ORAL at 08:08

## 2025-06-09 RX ADMIN — BUDESONIDE 500 MCG: 0.5 SUSPENSION RESPIRATORY (INHALATION) at 05:26

## 2025-06-09 RX ADMIN — BISACODYL 10 MG: 5 TABLET, COATED ORAL at 16:01

## 2025-06-09 RX ADMIN — BUDESONIDE 500 MCG: 0.5 SUSPENSION RESPIRATORY (INHALATION) at 16:44

## 2025-06-09 RX ADMIN — POLYETHYLENE GLYCOL-3350 AND ELECTROLYTES 4000 ML: 236; 6.74; 5.86; 2.97; 22.74 POWDER, FOR SOLUTION ORAL at 16:00

## 2025-06-09 RX ADMIN — ARFORMOTEROL TARTRATE 15 MCG: 15 SOLUTION RESPIRATORY (INHALATION) at 05:26

## 2025-06-09 RX ADMIN — PHENOBARBITAL SODIUM 16.2 MG: 65 INJECTION INTRAMUSCULAR; INTRAVENOUS at 01:45

## 2025-06-09 RX ADMIN — SERTRALINE 100 MG: 50 TABLET, FILM COATED ORAL at 08:08

## 2025-06-09 RX ADMIN — THIAMINE HYDROCHLORIDE 100 MG: 100 INJECTION, SOLUTION INTRAMUSCULAR; INTRAVENOUS at 21:29

## 2025-06-09 RX ADMIN — THIAMINE HYDROCHLORIDE 100 MG: 100 INJECTION, SOLUTION INTRAMUSCULAR; INTRAVENOUS at 08:10

## 2025-06-09 RX ADMIN — PHENOBARBITAL SODIUM 16.2 MG: 65 INJECTION INTRAMUSCULAR; INTRAVENOUS at 08:12

## 2025-06-09 RX ADMIN — SODIUM CHLORIDE, PRESERVATIVE FREE 10 ML: 5 INJECTION INTRAVENOUS at 08:10

## 2025-06-09 RX ADMIN — SODIUM CHLORIDE, PRESERVATIVE FREE 40 MG: 5 INJECTION INTRAVENOUS at 21:29

## 2025-06-09 RX ADMIN — SODIUM CHLORIDE, PRESERVATIVE FREE 40 MG: 5 INJECTION INTRAVENOUS at 16:01

## 2025-06-09 RX ADMIN — METOPROLOL TARTRATE 50 MG: 50 TABLET, FILM COATED ORAL at 08:08

## 2025-06-09 RX ADMIN — SODIUM CHLORIDE, PRESERVATIVE FREE 40 MG: 5 INJECTION INTRAVENOUS at 09:55

## 2025-06-09 RX ADMIN — BUPROPION HYDROCHLORIDE 150 MG: 150 TABLET, EXTENDED RELEASE ORAL at 21:34

## 2025-06-09 RX ADMIN — SODIUM CHLORIDE, PRESERVATIVE FREE 10 ML: 5 INJECTION INTRAVENOUS at 08:09

## 2025-06-09 NOTE — CARE COORDINATION
Case Management Assessment  Initial Evaluation    Date/Time of Evaluation: 6/9/2025 4:23 PM  Assessment Completed by: RICARDO Baker    If patient is discharged prior to next notation, then this note serves as note for discharge by case management.    Patient Name: Bobby Dubose                   YOB: 1960  Diagnosis: Acute upper GI hemorrhage [K92.2]  GI bleed [K92.2]  Alcohol withdrawal syndrome without complication (HCC) [F10.930]  Alcohol use disorder [F10.90]                   Date / Time: 6/6/2025  2:44 PM    Patient Admission Status: Inpatient   Readmission Risk (Low < 19, Mod (19-27), High > 27): Readmission Risk Score: 8.5    Current PCP: Rebel Rubio MD  PCP verified by CM? Yes    Chart Reviewed: Yes      History Provided by: Patient  Patient Orientation: Alert and Oriented    Patient Cognition: Alert    Hospitalization in the last 30 days (Readmission):  No    If yes, Readmission Assessment in CM Navigator will be completed.    Advance Directives:      Code Status: Full Code   Patient's Primary Decision Maker is: Legal Next of Kin      Discharge Planning:    Patient lives with: Spouse/Significant Other Type of Home: House  Primary Care Giver: Self  Patient Support Systems include: Spouse/Significant Other   Current Financial resources:    Current community resources:    Current services prior to admission: None            Current DME:              Type of Home Care services:  None    ADLS  Prior functional level: Independent in ADLs/IADLs  Current functional level: Independent in ADLs/IADLs    PT AM-PAC:   /24  OT AM-PAC:   /24    Family can provide assistance at DC: Yes  Would you like Case Management to discuss the discharge plan with any other family members/significant others, and if so, who? Yes (S/O Phyllis)  Plans to Return to Present Housing: Yes  Other Identified Issues/Barriers to RETURNING to current housing: no barriers identified at this time.   Potential

## 2025-06-10 ENCOUNTER — ANESTHESIA EVENT (OUTPATIENT)
Dept: ENDOSCOPY | Age: 65
DRG: 378 | End: 2025-06-10
Payer: COMMERCIAL

## 2025-06-10 ENCOUNTER — ANESTHESIA (OUTPATIENT)
Dept: ENDOSCOPY | Age: 65
DRG: 378 | End: 2025-06-10
Payer: COMMERCIAL

## 2025-06-10 LAB
ABO/RH: NORMAL
ALBUMIN SERPL-MCNC: 3.5 G/DL (ref 3.5–5.2)
ALP SERPL-CCNC: 86 U/L (ref 40–129)
ALT SERPL-CCNC: 28 U/L (ref 0–40)
ANION GAP SERPL CALCULATED.3IONS-SCNC: 13 MMOL/L (ref 7–16)
ANTIBODY SCREEN: NEGATIVE
ARM BAND NUMBER: NORMAL
AST SERPL-CCNC: 30 U/L (ref 0–39)
BASOPHILS # BLD: 0.03 K/UL (ref 0–0.2)
BASOPHILS NFR BLD: 1 % (ref 0–2)
BILIRUB SERPL-MCNC: 0.5 MG/DL (ref 0–1.2)
BLOOD BANK BLOOD PRODUCT EXPIRATION DATE: NORMAL
BLOOD BANK BLOOD PRODUCT EXPIRATION DATE: NORMAL
BLOOD BANK DISPENSE STATUS: NORMAL
BLOOD BANK ISBT PRODUCT BLOOD TYPE: 9500
BLOOD BANK ISBT PRODUCT BLOOD TYPE: 9500
BLOOD BANK PRODUCT CODE: NORMAL
BLOOD BANK PRODUCT CODE: NORMAL
BLOOD BANK SAMPLE EXPIRATION: NORMAL
BLOOD BANK UNIT TYPE AND RH: NORMAL
BLOOD BANK UNIT TYPE AND RH: NORMAL
BPU ID: NORMAL
BUN SERPL-MCNC: 5 MG/DL (ref 6–23)
CALCIUM SERPL-MCNC: 8.5 MG/DL (ref 8.6–10.2)
CHLORIDE SERPL-SCNC: 97 MMOL/L (ref 98–107)
CO2 SERPL-SCNC: 23 MMOL/L (ref 22–29)
COMPONENT: NORMAL
CREAT SERPL-MCNC: 0.8 MG/DL (ref 0.7–1.2)
CROSSMATCH RESULT: NORMAL
EOSINOPHIL # BLD: 0.19 K/UL (ref 0.05–0.5)
EOSINOPHILS RELATIVE PERCENT: 3 % (ref 0–6)
ERYTHROCYTE [DISTWIDTH] IN BLOOD BY AUTOMATED COUNT: 12.2 % (ref 11.5–15)
GFR, ESTIMATED: >90 ML/MIN/1.73M2
GLUCOSE SERPL-MCNC: 102 MG/DL (ref 74–99)
HCT VFR BLD AUTO: 26.1 % (ref 37–54)
HGB BLD-MCNC: 9.3 G/DL (ref 12.5–16.5)
IMM GRANULOCYTES # BLD AUTO: 0.03 K/UL (ref 0–0.58)
IMM GRANULOCYTES NFR BLD: 1 % (ref 0–5)
LYMPHOCYTES NFR BLD: 0.6 K/UL (ref 1.5–4)
LYMPHOCYTES RELATIVE PERCENT: 10 % (ref 20–42)
MCH RBC QN AUTO: 32.7 PG (ref 26–35)
MCHC RBC AUTO-ENTMCNC: 35.6 G/DL (ref 32–34.5)
MCV RBC AUTO: 91.9 FL (ref 80–99.9)
MONOCYTES NFR BLD: 0.51 K/UL (ref 0.1–0.95)
MONOCYTES NFR BLD: 8 % (ref 2–12)
NEUTROPHILS NFR BLD: 78 % (ref 43–80)
NEUTS SEG NFR BLD: 4.9 K/UL (ref 1.8–7.3)
PLATELET # BLD AUTO: 215 K/UL (ref 130–450)
PMV BLD AUTO: 9.9 FL (ref 7–12)
POTASSIUM SERPL-SCNC: 3.9 MMOL/L (ref 3.5–5)
PROT SERPL-MCNC: 6 G/DL (ref 6.4–8.3)
RBC # BLD AUTO: 2.84 M/UL (ref 3.8–5.8)
SODIUM SERPL-SCNC: 133 MMOL/L (ref 132–146)
TRANSFUSION STATUS: NORMAL
UNIT DIVISION: 0
UNIT ISSUE DATE/TIME: NORMAL
UNIT ISSUE DATE/TIME: NORMAL
WBC OTHER # BLD: 6.3 K/UL (ref 4.5–11.5)

## 2025-06-10 PROCEDURE — 6370000000 HC RX 637 (ALT 250 FOR IP): Performed by: INTERNAL MEDICINE

## 2025-06-10 PROCEDURE — 6360000002 HC RX W HCPCS: Performed by: FAMILY MEDICINE

## 2025-06-10 PROCEDURE — 6360000002 HC RX W HCPCS: Performed by: HOSPITALIST

## 2025-06-10 PROCEDURE — 2500000003 HC RX 250 WO HCPCS: Performed by: NURSE PRACTITIONER

## 2025-06-10 PROCEDURE — 3609027000 HC COLONOSCOPY: Performed by: INTERNAL MEDICINE

## 2025-06-10 PROCEDURE — 36415 COLL VENOUS BLD VENIPUNCTURE: CPT

## 2025-06-10 PROCEDURE — 7100000011 HC PHASE II RECOVERY - ADDTL 15 MIN: Performed by: INTERNAL MEDICINE

## 2025-06-10 PROCEDURE — 2500000003 HC RX 250 WO HCPCS: Performed by: HOSPITALIST

## 2025-06-10 PROCEDURE — 7100000010 HC PHASE II RECOVERY - FIRST 15 MIN: Performed by: INTERNAL MEDICINE

## 2025-06-10 PROCEDURE — 2580000003 HC RX 258: Performed by: NURSE ANESTHETIST, CERTIFIED REGISTERED

## 2025-06-10 PROCEDURE — 6360000002 HC RX W HCPCS: Performed by: NURSE ANESTHETIST, CERTIFIED REGISTERED

## 2025-06-10 PROCEDURE — 6360000002 HC RX W HCPCS: Performed by: NURSE PRACTITIONER

## 2025-06-10 PROCEDURE — 3700000001 HC ADD 15 MINUTES (ANESTHESIA): Performed by: INTERNAL MEDICINE

## 2025-06-10 PROCEDURE — 85025 COMPLETE CBC W/AUTO DIFF WBC: CPT

## 2025-06-10 PROCEDURE — 2709999900 HC NON-CHARGEABLE SUPPLY: Performed by: INTERNAL MEDICINE

## 2025-06-10 PROCEDURE — 80053 COMPREHEN METABOLIC PANEL: CPT

## 2025-06-10 PROCEDURE — 2060000000 HC ICU INTERMEDIATE R&B

## 2025-06-10 PROCEDURE — 94640 AIRWAY INHALATION TREATMENT: CPT

## 2025-06-10 PROCEDURE — 0DJD8ZZ INSPECTION OF LOWER INTESTINAL TRACT, VIA NATURAL OR ARTIFICIAL OPENING ENDOSCOPIC: ICD-10-PCS | Performed by: INTERNAL MEDICINE

## 2025-06-10 PROCEDURE — 6370000000 HC RX 637 (ALT 250 FOR IP): Performed by: FAMILY MEDICINE

## 2025-06-10 PROCEDURE — 6370000000 HC RX 637 (ALT 250 FOR IP): Performed by: NURSE PRACTITIONER

## 2025-06-10 PROCEDURE — 3700000000 HC ANESTHESIA ATTENDED CARE: Performed by: INTERNAL MEDICINE

## 2025-06-10 RX ORDER — CLOPIDOGREL BISULFATE 75 MG/1
75 TABLET ORAL DAILY
Status: DISCONTINUED | OUTPATIENT
Start: 2025-06-10 | End: 2025-06-11 | Stop reason: HOSPADM

## 2025-06-10 RX ORDER — SODIUM CHLORIDE 9 MG/ML
INJECTION, SOLUTION INTRAVENOUS
Status: DISCONTINUED | OUTPATIENT
Start: 2025-06-10 | End: 2025-06-10 | Stop reason: SDUPTHER

## 2025-06-10 RX ORDER — PROPOFOL 10 MG/ML
INJECTION, EMULSION INTRAVENOUS
Status: DISCONTINUED | OUTPATIENT
Start: 2025-06-10 | End: 2025-06-10 | Stop reason: SDUPTHER

## 2025-06-10 RX ADMIN — SODIUM CHLORIDE, PRESERVATIVE FREE 40 MG: 5 INJECTION INTRAVENOUS at 16:38

## 2025-06-10 RX ADMIN — BUPROPION HYDROCHLORIDE 150 MG: 150 TABLET, EXTENDED RELEASE ORAL at 21:30

## 2025-06-10 RX ADMIN — SODIUM CHLORIDE: 9 INJECTION, SOLUTION INTRAVENOUS at 14:16

## 2025-06-10 RX ADMIN — LEVALBUTEROL HYDROCHLORIDE 0.63 MG: 0.63 SOLUTION RESPIRATORY (INHALATION) at 17:26

## 2025-06-10 RX ADMIN — BUDESONIDE 500 MCG: 0.5 SUSPENSION RESPIRATORY (INHALATION) at 07:35

## 2025-06-10 RX ADMIN — SODIUM CHLORIDE, PRESERVATIVE FREE 40 MG: 5 INJECTION INTRAVENOUS at 03:43

## 2025-06-10 RX ADMIN — PROPOFOL 100 MCG/KG/MIN: 10 INJECTION, EMULSION INTRAVENOUS at 14:22

## 2025-06-10 RX ADMIN — ARFORMOTEROL TARTRATE 15 MCG: 15 SOLUTION RESPIRATORY (INHALATION) at 17:26

## 2025-06-10 RX ADMIN — SODIUM CHLORIDE, PRESERVATIVE FREE 10 ML: 5 INJECTION INTRAVENOUS at 21:33

## 2025-06-10 RX ADMIN — BUDESONIDE 500 MCG: 0.5 SUSPENSION RESPIRATORY (INHALATION) at 17:26

## 2025-06-10 RX ADMIN — SODIUM CHLORIDE, PRESERVATIVE FREE 10 ML: 5 INJECTION INTRAVENOUS at 21:32

## 2025-06-10 RX ADMIN — PROPOFOL 50 MG: 10 INJECTION, EMULSION INTRAVENOUS at 14:21

## 2025-06-10 RX ADMIN — PROPOFOL 30 MG: 10 INJECTION, EMULSION INTRAVENOUS at 14:26

## 2025-06-10 RX ADMIN — METOPROLOL TARTRATE 50 MG: 50 TABLET, FILM COATED ORAL at 09:57

## 2025-06-10 RX ADMIN — ATORVASTATIN CALCIUM 80 MG: 40 TABLET, FILM COATED ORAL at 21:30

## 2025-06-10 RX ADMIN — THIAMINE HYDROCHLORIDE 100 MG: 100 INJECTION, SOLUTION INTRAMUSCULAR; INTRAVENOUS at 21:30

## 2025-06-10 RX ADMIN — THIAMINE HYDROCHLORIDE 100 MG: 100 INJECTION, SOLUTION INTRAMUSCULAR; INTRAVENOUS at 09:07

## 2025-06-10 RX ADMIN — ARFORMOTEROL TARTRATE 15 MCG: 15 SOLUTION RESPIRATORY (INHALATION) at 07:35

## 2025-06-10 RX ADMIN — SODIUM CHLORIDE, PRESERVATIVE FREE 40 MG: 5 INJECTION INTRAVENOUS at 08:39

## 2025-06-10 RX ADMIN — SODIUM CHLORIDE, PRESERVATIVE FREE 40 MG: 5 INJECTION INTRAVENOUS at 21:30

## 2025-06-10 RX ADMIN — PROPOFOL 40 MG: 10 INJECTION, EMULSION INTRAVENOUS at 14:24

## 2025-06-10 NOTE — ANESTHESIA POSTPROCEDURE EVALUATION
Department of Anesthesiology  Postprocedure Note    Patient: Bobby Dubose  MRN: [id number]  YOB: 1960  Date of evaluation: 6/10/2025    Procedure Summary       Date: 06/10/25 Room / Location: [place] [address] / [facility]    Anesthesia Start: 1416 Anesthesia Stop: 1455    Procedure: COLONOSCOPY DIAGNOSTIC Diagnosis:       Acute anemia      (Acute anemia [D64.9])    Surgeons: Augusto Shi MD Responsible Provider: Angel Norris MD    Anesthesia Type: MAC ASA Status: 3            Anesthesia Type: No value filed.    Shanell Phase I:      Shanell Phase II: Shanell Score: 9    Anesthesia Post Evaluation    Patient location during evaluation: PACU  Patient participation: complete - patient participated  Level of consciousness: awake  Airway patency: patent  Nausea & Vomiting: no nausea and no vomiting  Cardiovascular status: hemodynamically stable  Respiratory status: acceptable  Hydration status: euvolemic  Pain management: adequate        No notable events documented.

## 2025-06-10 NOTE — ANESTHESIA PRE PROCEDURE
ALKPHOS 86 06/10/2025 08:47 AM    AST 30 06/10/2025 08:47 AM    ALT 28 06/10/2025 08:47 AM       POC Tests: No results for input(s): \"POCGLU\", \"POCNA\", \"POCK\", \"POCCL\", \"POCBUN\", \"POCHEMO\", \"POCHCT\" in the last 72 hours.    Coags:   Lab Results   Component Value Date/Time    PROTIME 12.2 06/06/2025 03:58 PM    INR 1.1 06/06/2025 03:58 PM       HCG (If Applicable): No results found for: \"PREGTESTUR\", \"PREGSERUM\", \"HCG\", \"HCGQUANT\"     ABGs: No results found for: \"PHART\", \"PO2ART\", \"OHQ0SEE\", \"IZW5REC\", \"BEART\", \"N0YXADLD\"     Type & Screen (If Applicable):  Lab Results   Component Value Date    ABORH B NEGATIVE 06/06/2025    LABANTI NEGATIVE 06/06/2025       Drug/Infectious Status (If Applicable):  No results found for: \"HIV\", \"HEPCAB\"    COVID-19 Screening (If Applicable): No results found for: \"COVID19\"    EKG 12 Lead  Order: [id number]   Status: Final result       Next appt: 06/16/2025 at 11:30 AM in Vascular Surgery (Elvira Hagen MD)    Test Result Released: No    0 Result Notes       Component  Ref Range & Units (hover) 6/6/25 1447 8/31/15 0932   Ventricular Rate 120    Atrial Rate 120    P-R Interval 178 158   QRS Duration 84    Q-T Interval 306 362   QTc Calculation (Bazett) 432    R Axis -74    T Axis 74 20 R   Resulting Agency [facility]              Narrative & Impression    Sinus tachycardia  Left anterior fascicular block  Possible Anterolateral infarct , age undetermined  Abnormal ECG  No previous ECGs available  Confirmed by Keron Macias ([id number]) on 6/6/2025 3:58:23 PM   Specimen Collected: 06/06/25 14:47 EDT       Anesthesia Evaluation  Patient summary reviewed   no history of anesthetic complications:   Airway: Mallampati: IV  TM distance: >3 FB   Neck ROM: full  Mouth opening: > = 3 FB   Dental: normal exam         Pulmonary: breath sounds clear to auscultation  (+)  COPD:                                     Cardiovascular:    (+) hypertension:, hyperlipidemia      ECG reviewed  Rhythm:

## 2025-06-10 NOTE — OP NOTE
Operative Note      Patient: Bobby Dubose  YOB: 1960  MRN: [id number]    Date of Procedure: 6/10/2025    Pre-Op Diagnosis Codes:      * Acute anemia [D64.9]    Post-Op Diagnosis: Anatomical study, minimal diverticulosis.       Procedure(s):  COLONOSCOPY DIAGNOSTIC    Surgeon(s):  Augusto Shi MD    Assistant:   Surgical Assistant: Nadya Saleem RN    Anesthesia: Monitor Anesthesia Care    Estimated Blood Loss (mL): None    Complications: None    Specimens:   * No specimens in log *    Implants:  * No implants in log *      Drains:   [REMOVED] NG/OG/NJ/NE Tube Nasogastric Left nostril (Removed)   Surrounding Skin Clean, dry & intact 06/08/25 0800   Securement device Tape 06/08/25 0800   Status Other (Comment) 06/08/25 1000   Placement Verified X-Ray (Initial) 06/06/25 2233   NG/OG/NJ/NE External Measurement (cm) 59 cm 06/08/25 0800   Drainage Appearance Montague;White 06/08/25 0800   Output (mL) 200 ml 06/07/25 1844       Findings:  Infection Present At Time Of Surgery (PATOS) (choose all levels that have infection present):  None  Other Findings: None    Detailed Description of Procedure:   64-year-old male patient, with acute GI blood loss anemia.    Digital rectal exam: Anatomic.  The colonoscope was introduced to the anus.  Advanced gently to the cecum.  The prep was good.  The mucosa was normal.  Only few diverticular openings in the sigmoid.  Normal hemorrhoidal cushion.    Discharge to floor.  Resume hospital care.  Further GI plans as per Dr. Magaña.    Electronically signed by Augusto Shi MD on 6/10/2025 at 2:10 PM

## 2025-06-11 VITALS
BODY MASS INDEX: 32.18 KG/M2 | OXYGEN SATURATION: 100 % | HEIGHT: 67 IN | RESPIRATION RATE: 20 BRPM | SYSTOLIC BLOOD PRESSURE: 138 MMHG | DIASTOLIC BLOOD PRESSURE: 71 MMHG | WEIGHT: 205 LBS | HEART RATE: 68 BPM | TEMPERATURE: 98.1 F

## 2025-06-11 LAB
ALBUMIN SERPL-MCNC: 3.5 G/DL (ref 3.5–5.2)
ALP SERPL-CCNC: 103 U/L (ref 40–129)
ALT SERPL-CCNC: 36 U/L (ref 0–50)
ANION GAP SERPL CALCULATED.3IONS-SCNC: 11 MMOL/L (ref 7–16)
AST SERPL-CCNC: 29 U/L (ref 0–50)
BASOPHILS # BLD: 0.02 K/UL (ref 0–0.2)
BASOPHILS # BLD: 0.02 K/UL (ref 0–0.2)
BASOPHILS NFR BLD: 0 % (ref 0–2)
BASOPHILS NFR BLD: 0 % (ref 0–2)
BILIRUB SERPL-MCNC: 0.4 MG/DL (ref 0–1.2)
BUN SERPL-MCNC: 5 MG/DL (ref 8–23)
CALCIUM SERPL-MCNC: 8.5 MG/DL (ref 8.8–10.2)
CHLORIDE SERPL-SCNC: 99 MMOL/L (ref 98–107)
CO2 SERPL-SCNC: 24 MMOL/L (ref 22–29)
CREAT SERPL-MCNC: 0.8 MG/DL (ref 0.7–1.2)
EOSINOPHIL # BLD: 0.17 K/UL (ref 0.05–0.5)
EOSINOPHIL # BLD: 0.18 K/UL (ref 0.05–0.5)
EOSINOPHILS RELATIVE PERCENT: 3 % (ref 0–6)
EOSINOPHILS RELATIVE PERCENT: 3 % (ref 0–6)
ERYTHROCYTE [DISTWIDTH] IN BLOOD BY AUTOMATED COUNT: 12.4 % (ref 11.5–15)
ERYTHROCYTE [DISTWIDTH] IN BLOOD BY AUTOMATED COUNT: 12.5 % (ref 11.5–15)
GFR, ESTIMATED: >90 ML/MIN/1.73M2
GLUCOSE SERPL-MCNC: 98 MG/DL (ref 74–99)
HCT VFR BLD AUTO: 30.4 % (ref 37–54)
HCT VFR BLD AUTO: 30.4 % (ref 37–54)
HGB BLD-MCNC: 10.3 G/DL (ref 12.5–16.5)
HGB BLD-MCNC: 10.5 G/DL (ref 12.5–16.5)
IMM GRANULOCYTES # BLD AUTO: 0.03 K/UL (ref 0–0.58)
IMM GRANULOCYTES # BLD AUTO: 0.04 K/UL (ref 0–0.58)
IMM GRANULOCYTES NFR BLD: 1 % (ref 0–5)
IMM GRANULOCYTES NFR BLD: 1 % (ref 0–5)
LYMPHOCYTES NFR BLD: 0.7 K/UL (ref 1.5–4)
LYMPHOCYTES NFR BLD: 0.71 K/UL (ref 1.5–4)
LYMPHOCYTES RELATIVE PERCENT: 11 % (ref 20–42)
LYMPHOCYTES RELATIVE PERCENT: 12 % (ref 20–42)
MCH RBC QN AUTO: 31.6 PG (ref 26–35)
MCH RBC QN AUTO: 31.8 PG (ref 26–35)
MCHC RBC AUTO-ENTMCNC: 33.9 G/DL (ref 32–34.5)
MCHC RBC AUTO-ENTMCNC: 34.5 G/DL (ref 32–34.5)
MCV RBC AUTO: 92.1 FL (ref 80–99.9)
MCV RBC AUTO: 93.3 FL (ref 80–99.9)
MONOCYTES NFR BLD: 0.49 K/UL (ref 0.1–0.95)
MONOCYTES NFR BLD: 0.7 K/UL (ref 0.1–0.95)
MONOCYTES NFR BLD: 11 % (ref 2–12)
MONOCYTES NFR BLD: 8 % (ref 2–12)
NEUTROPHILS NFR BLD: 75 % (ref 43–80)
NEUTROPHILS NFR BLD: 77 % (ref 43–80)
NEUTS SEG NFR BLD: 4.71 K/UL (ref 1.8–7.3)
NEUTS SEG NFR BLD: 4.84 K/UL (ref 1.8–7.3)
PLATELET # BLD AUTO: 236 K/UL (ref 130–450)
PLATELET # BLD AUTO: 261 K/UL (ref 130–450)
PMV BLD AUTO: 10.4 FL (ref 7–12)
PMV BLD AUTO: 9.8 FL (ref 7–12)
POTASSIUM SERPL-SCNC: 3.8 MMOL/L (ref 3.5–5.1)
PROT SERPL-MCNC: 6.1 G/DL (ref 6.4–8.3)
RBC # BLD AUTO: 3.26 M/UL (ref 3.8–5.8)
RBC # BLD AUTO: 3.3 M/UL (ref 3.8–5.8)
SODIUM SERPL-SCNC: 133 MMOL/L (ref 136–145)
WBC OTHER # BLD: 6.1 K/UL (ref 4.5–11.5)
WBC OTHER # BLD: 6.5 K/UL (ref 4.5–11.5)

## 2025-06-11 PROCEDURE — 6360000002 HC RX W HCPCS: Performed by: HOSPITALIST

## 2025-06-11 PROCEDURE — 6370000000 HC RX 637 (ALT 250 FOR IP): Performed by: NURSE PRACTITIONER

## 2025-06-11 PROCEDURE — 6370000000 HC RX 637 (ALT 250 FOR IP): Performed by: FAMILY MEDICINE

## 2025-06-11 PROCEDURE — 80053 COMPREHEN METABOLIC PANEL: CPT

## 2025-06-11 PROCEDURE — 2500000003 HC RX 250 WO HCPCS: Performed by: HOSPITALIST

## 2025-06-11 PROCEDURE — 6360000002 HC RX W HCPCS: Performed by: NURSE PRACTITIONER

## 2025-06-11 PROCEDURE — 6370000000 HC RX 637 (ALT 250 FOR IP): Performed by: INTERNAL MEDICINE

## 2025-06-11 PROCEDURE — 6360000002 HC RX W HCPCS: Performed by: FAMILY MEDICINE

## 2025-06-11 PROCEDURE — 2500000003 HC RX 250 WO HCPCS: Performed by: NURSE PRACTITIONER

## 2025-06-11 PROCEDURE — 94640 AIRWAY INHALATION TREATMENT: CPT

## 2025-06-11 PROCEDURE — 85025 COMPLETE CBC W/AUTO DIFF WBC: CPT

## 2025-06-11 PROCEDURE — 36415 COLL VENOUS BLD VENIPUNCTURE: CPT

## 2025-06-11 RX ORDER — THIAMINE MONONITRATE (VIT B1) 100 MG
100 TABLET ORAL DAILY
Qty: 30 TABLET | Refills: 1 | Status: SHIPPED | OUTPATIENT
Start: 2025-06-11

## 2025-06-11 RX ORDER — ATORVASTATIN CALCIUM 80 MG/1
80 TABLET, FILM COATED ORAL NIGHTLY
Qty: 30 TABLET | Refills: 1 | Status: SHIPPED | OUTPATIENT
Start: 2025-06-11

## 2025-06-11 RX ORDER — FOLIC ACID 1 MG/1
1 TABLET ORAL DAILY
Qty: 30 TABLET | Refills: 1 | Status: SHIPPED | OUTPATIENT
Start: 2025-06-12

## 2025-06-11 RX ORDER — PANTOPRAZOLE SODIUM 40 MG/1
40 TABLET, DELAYED RELEASE ORAL
Qty: 90 TABLET | Refills: 0 | Status: SHIPPED | OUTPATIENT
Start: 2025-06-11

## 2025-06-11 RX ORDER — SERTRALINE HYDROCHLORIDE 100 MG/1
100 TABLET, FILM COATED ORAL DAILY
Qty: 30 TABLET | Refills: 1 | Status: SHIPPED | OUTPATIENT
Start: 2025-06-11

## 2025-06-11 RX ADMIN — SODIUM CHLORIDE, PRESERVATIVE FREE 40 MG: 5 INJECTION INTRAVENOUS at 04:37

## 2025-06-11 RX ADMIN — FOLIC ACID 1 MG: 1 TABLET ORAL at 08:25

## 2025-06-11 RX ADMIN — CLOPIDOGREL BISULFATE 75 MG: 75 TABLET, FILM COATED ORAL at 08:25

## 2025-06-11 RX ADMIN — BUPROPION HYDROCHLORIDE 150 MG: 150 TABLET, EXTENDED RELEASE ORAL at 08:25

## 2025-06-11 RX ADMIN — THIAMINE HYDROCHLORIDE 100 MG: 100 INJECTION, SOLUTION INTRAMUSCULAR; INTRAVENOUS at 08:26

## 2025-06-11 RX ADMIN — METOPROLOL TARTRATE 50 MG: 50 TABLET, FILM COATED ORAL at 08:25

## 2025-06-11 RX ADMIN — SODIUM CHLORIDE, PRESERVATIVE FREE 40 MG: 5 INJECTION INTRAVENOUS at 08:26

## 2025-06-11 RX ADMIN — ARFORMOTEROL TARTRATE 15 MCG: 15 SOLUTION RESPIRATORY (INHALATION) at 05:09

## 2025-06-11 RX ADMIN — SERTRALINE 100 MG: 50 TABLET, FILM COATED ORAL at 08:25

## 2025-06-11 RX ADMIN — SODIUM CHLORIDE, PRESERVATIVE FREE 10 ML: 5 INJECTION INTRAVENOUS at 08:26

## 2025-06-11 RX ADMIN — SODIUM CHLORIDE, PRESERVATIVE FREE 10 ML: 5 INJECTION INTRAVENOUS at 08:27

## 2025-06-11 RX ADMIN — BUDESONIDE 500 MCG: 0.5 SUSPENSION RESPIRATORY (INHALATION) at 05:09

## 2025-06-11 ASSESSMENT — PAIN SCALES - GENERAL: PAINLEVEL_OUTOF10: 0

## 2025-06-11 NOTE — DISCHARGE SUMMARY
[facility] Hospitalist       Hospitalist Physician Discharge Summary       No follow-up provider specified.    Activity level: ambulating well     Diet: ADULT DIET; Regular    Condition at discharge: stable     Dispo:home   .    Patient ID:  Bobby Dubose  [id number]  64 y.o.  1960    Admit date: 6/6/2025    Discharge date and time:  6/11/2025  2:33 PM    Admission Diagnoses: Principal Problem:    GI bleed  Active Problems:    Hypertension    PVD (peripheral vascular disease) with claudication    Alcoholism (HCC)    Atherosclerosis of superficial femoral artery    S/P peripheral artery angioplasty  Resolved Problems:    * No resolved hospital problems. *      Discharge Diagnoses: Principal Problem:    GI bleed  Active Problems:    Hypertension    PVD (peripheral vascular disease) with claudication    Alcoholism (HCC)    Atherosclerosis of superficial femoral artery    S/P peripheral artery angioplasty  Resolved Problems:    * No resolved hospital problems. *      Consults:  IP CONSULT TO GI  IP CONSULT TO SOCIAL WORK    Procedures: EGD 6/8 showed gastritis mild duodenitis and GERD with possible Menard's  Colonoscopy 6/10 few diverticular openings in the sigmoid.    Hospital Course: 64 years old white gentleman with significant past medical history of alcohol abuse, COPD, hypertension, hyperlipidemia presented to the emergency room because black stools and dizziness and feeling weak all over apparently on 6/4/2025 he underwent right superficial femoral lithotripsy and angioplasty and right popliteal artery angioplasty with drug-coated balloon.  He was started on Plavix and also he was taking ibuprofen and drinking about 6 beers a day.  He had multiple episodes of dark tarry stools and was feeling lightheaded and dizzy he came to the emergency room he was hypotensive his hemoglobin was 9.3 which was a week prior 15.3.  He was given IV fluids and Protonix IV nausea transfused 2 units of packed RBCs.

## 2025-06-11 NOTE — PLAN OF CARE
Problem: Discharge Planning  Goal: Discharge to home or other facility with appropriate resources  6/10/2025 0925 by Marcie Harris, RN  Outcome: Progressing  6/10/2025 0123 by Mee Villalpando, RN  Outcome: Progressing     
  Problem: Discharge Planning  Goal: Discharge to home or other facility with appropriate resources  6/10/2025 1701 by Marcie Harris, RN  Outcome: Progressing  6/10/2025 0925 by Marcie Harris, RN  Outcome: Progressing     
  Problem: Discharge Planning  Goal: Discharge to home or other facility with appropriate resources  6/11/2025 0203 by Mee Villalpando RN  Outcome: Progressing  6/10/2025 1701 by Marcie Harris RN  Outcome: Progressing     Problem: ABCDS Injury Assessment  Goal: Absence of physical injury  6/11/2025 0203 by Mee Villalpando RN  Outcome: Progressing  6/10/2025 1701 by Marcie Harris RN  Outcome: Progressing     Problem: Safety - Adult  Goal: Free from fall injury  6/11/2025 0203 by Mee Villalpando RN  Outcome: Progressing  6/10/2025 1701 by Marcie Harris RN  Outcome: Progressing     Problem: Skin/Tissue Integrity  Goal: Skin integrity remains intact  Description: 1.  Monitor for areas of redness and/or skin breakdown2.  Assess vascular access sites hourly3.  Every 4-6 hours minimum:  Change oxygen saturation probe site4.  Every 4-6 hours:  If on nasal continuous positive airway pressure, respiratory therapy assess nares and determine need for appliance change or resting period  6/11/2025 0203 by Mee Villalpando RN  Outcome: Progressing  6/10/2025 1701 by Marcie Harris RN  Outcome: Progressing     Problem: Pain  Goal: Verbalizes/displays adequate comfort level or baseline comfort level  6/11/2025 0203 by Mee Villalpando RN  Outcome: Progressing  6/10/2025 1701 by Marcie Harris RN  Outcome: Progressing     Problem: Seizure Precautions  Goal: Remains free of injury related to seizures activity  6/11/2025 0203 by Mee Villalpando RN  Outcome: Progressing  6/10/2025 1701 by Marcie Harris RN  Outcome: Progressing     
  Problem: Discharge Planning  Goal: Discharge to home or other facility with appropriate resources  6/11/2025 1104 by Marcie Harris, RN  Outcome: Progressing  6/11/2025 0203 by Mee Villalpando, RN  Outcome: Progressing     
  Problem: Discharge Planning  Goal: Discharge to home or other facility with appropriate resources  6/7/2025 2124 by Siri Quach, RN  Outcome: Progressing  Flowsheets (Taken 6/7/2025 2117)  Discharge to home or other facility with appropriate resources:   Identify barriers to discharge with patient and caregiver   Arrange for needed discharge resources and transportation as appropriate   Identify discharge learning needs (meds, wound care, etc)   Refer to discharge planning if patient needs post-hospital services based on physician order or complex needs related to functional status, cognitive ability or social support system  6/7/2025 1807 by Keyana Zendejas, RN  Outcome: Progressing     Problem: ABCDS Injury Assessment  Goal: Absence of physical injury  6/7/2025 2124 by Siri Quach, RN  Outcome: Progressing  6/7/2025 1807 by Keyana Zendejas, RN  Outcome: Progressing     Problem: Safety - Adult  Goal: Free from fall injury  6/7/2025 2124 by Siri Quach, RN  Outcome: Progressing  6/7/2025 1807 by Keyana Zendejas, RN  Outcome: Progressing     
  Problem: Discharge Planning  Goal: Discharge to home or other facility with appropriate resources  6/8/2025 2255 by Siri Quach RN  Outcome: Progressing  Flowsheets (Taken 6/8/2025 2250)  Discharge to home or other facility with appropriate resources:   Identify barriers to discharge with patient and caregiver   Arrange for needed discharge resources and transportation as appropriate   Identify discharge learning needs (meds, wound care, etc)   Refer to discharge planning if patient needs post-hospital services based on physician order or complex needs related to functional status, cognitive ability or social support system  6/8/2025 1501 by Keyana Zendejas RN  Outcome: Progressing     Problem: ABCDS Injury Assessment  Goal: Absence of physical injury  6/8/2025 2255 by Siri Quach RN  Outcome: Progressing  6/8/2025 1501 by Keyana Zendejas RN  Outcome: Progressing     Problem: Safety - Adult  Goal: Free from fall injury  6/8/2025 2255 by Siri Quach RN  Outcome: Progressing  6/8/2025 1501 by Keyana Zendejas RN  Outcome: Progressing     Problem: Skin/Tissue Integrity  Goal: Skin integrity remains intact  Description: 1.  Monitor for areas of redness and/or skin breakdown2.  Assess vascular access sites hourly3.  Every 4-6 hours minimum:  Change oxygen saturation probe site4.  Every 4-6 hours:  If on nasal continuous positive airway pressure, respiratory therapy assess nares and determine need for appliance change or resting period  6/8/2025 2255 by Siri Quach RN  Outcome: Progressing  Flowsheets (Taken 6/8/2025 2250)  Skin Integrity Remains Intact: Monitor for areas of redness and/or skin breakdown  6/8/2025 1501 by Keyana Zendejas RN  Outcome: Progressing     Problem: Pain  Goal: Verbalizes/displays adequate comfort level or baseline comfort level  6/8/2025 2255 by Siri Quach RN  Outcome: Progressing  6/8/2025 1501 by Keyana Zendejas RN  Outcome: Progressing     
  Problem: Discharge Planning  Goal: Discharge to home or other facility with appropriate resources  Outcome: Progressing     Problem: ABCDS Injury Assessment  Goal: Absence of physical injury  Outcome: Progressing     Problem: Safety - Adult  Goal: Free from fall injury  Outcome: Progressing     
  Problem: Discharge Planning  Goal: Discharge to home or other facility with appropriate resources  Outcome: Progressing     Problem: ABCDS Injury Assessment  Goal: Absence of physical injury  Outcome: Progressing     Problem: Safety - Adult  Goal: Free from fall injury  Outcome: Progressing     Problem: Skin/Tissue Integrity  Goal: Skin integrity remains intact  Description: 1.  Monitor for areas of redness and/or skin breakdown2.  Assess vascular access sites hourly3.  Every 4-6 hours minimum:  Change oxygen saturation probe site4.  Every 4-6 hours:  If on nasal continuous positive airway pressure, respiratory therapy assess nares and determine need for appliance change or resting period  Outcome: Progressing     Problem: Pain  Goal: Verbalizes/displays adequate comfort level or baseline comfort level  Outcome: Progressing     
  Problem: Discharge Planning  Goal: Discharge to home or other facility with appropriate resources  Outcome: Progressing     Problem: ABCDS Injury Assessment  Goal: Absence of physical injury  Outcome: Progressing     Problem: Safety - Adult  Goal: Free from fall injury  Outcome: Progressing     Problem: Skin/Tissue Integrity  Goal: Skin integrity remains intact  Description: 1.  Monitor for areas of redness and/or skin breakdown2.  Assess vascular access sites hourly3.  Every 4-6 hours minimum:  Change oxygen saturation probe site4.  Every 4-6 hours:  If on nasal continuous positive airway pressure, respiratory therapy assess nares and determine need for appliance change or resting period  Outcome: Progressing     Problem: Pain  Goal: Verbalizes/displays adequate comfort level or baseline comfort level  Outcome: Progressing     Problem: Discharge Planning  Goal: Discharge to home or other facility with appropriate resources  Outcome: Progressing     Problem: ABCDS Injury Assessment  Goal: Absence of physical injury  Outcome: Progressing     Problem: Safety - Adult  Goal: Free from fall injury  Outcome: Progressing     Problem: Skin/Tissue Integrity  Goal: Skin integrity remains intact  Description: 1.  Monitor for areas of redness and/or skin breakdown2.  Assess vascular access sites hourly3.  Every 4-6 hours minimum:  Change oxygen saturation probe site4.  Every 4-6 hours:  If on nasal continuous positive airway pressure, respiratory therapy assess nares and determine need for appliance change or resting period  Outcome: Progressing     Problem: Pain  Goal: Verbalizes/displays adequate comfort level or baseline comfort level  Outcome: Progressing     
  Problem: Discharge Planning  Goal: Discharge to home or other facility with appropriate resources  Outcome: Progressing  Flowsheets (Taken 6/6/2025 2155)  Discharge to home or other facility with appropriate resources:   Identify barriers to discharge with patient and caregiver   Arrange for needed discharge resources and transportation as appropriate   Identify discharge learning needs (meds, wound care, etc)   Refer to discharge planning if patient needs post-hospital services based on physician order or complex needs related to functional status, cognitive ability or social support system     Problem: ABCDS Injury Assessment  Goal: Absence of physical injury  Outcome: Progressing     
0800)  Skin Integrity Remains Intact: Monitor for areas of redness and/or skin breakdown  6/8/2025 2255 by Siri Quach, RN  Outcome: Progressing  Flowsheets (Taken 6/8/2025 2250)  Skin Integrity Remains Intact: Monitor for areas of redness and/or skin breakdown     Problem: Pain  Goal: Verbalizes/displays adequate comfort level or baseline comfort level  6/9/2025 1053 by Maximus Potter, RN  Outcome: Progressing  6/8/2025 2255 by Siri Quach RN  Outcome: Progressing

## 2025-06-11 NOTE — CARE COORDINATION
6/11/25 Per IDR plan is for pt to discharge home today after afternoon blood work is received/reviewed. Met with pt at bedside & he confirmed no home going needs. Has support of his girlfriend whom he lives with & she will provide transport home. Pt has been seeing Dr. Rebel Rubio but recently switched and is scheduled to see Dr Resendiz on 6/13 to establish as new pcp. Electronically signed by RICARDO Ortiz on 6/11/2025 at 3:01 PM

## 2025-06-12 LAB — VIT B1 PYROPHOSHATE BLD-SCNC: 179 NMOL/L (ref 70–180)

## 2025-06-12 NOTE — PROGRESS NOTES
4 Eyes Skin Assessment     NAME:  Bobby Dubose  YOB: 1960  MEDICAL RECORD NUMBER:  [id number]    The patient is being assessed for  Admission    I agree that at least one RN has performed a thorough Head to Toe Skin Assessment on the patient. ALL assessment sites listed below have been assessed.      Areas assessed by both nurses:    Head, Face, Ears, Shoulders, Back, Chest, Arms, Elbows, Hands, Sacrum. Buttock, Coccyx, Ischium, Legs. Feet and Heels, and Under Medical Devices         Does the Patient have a Wound? No noted wound(s)       Oscar Prevention initiated by RN: Yes  Wound Care Orders initiated by RN: No    Pressure Injury (Stage 3,4, Unstageable, DTI, NWPT, and Complex wounds) if present, place Wound referral order by RN under : No    New Ostomies, if present place, Ostomy referral order under : No     Nurse 1 eSignature: Electronically signed by Siri Quach RN on 6/7/25 at 6:44 AM EDT    **SHARE this note so that the co-signing nurse can place an eSignature**    Nurse 2 eSignature: Electronically signed by Sienna Hernandez RN on 6/7/25 at 6:44 AM EDT   
Admitting Date and Time: 6/6/2025  2:44 PM  Admit Dx: Acute upper GI hemorrhage [K92.2]  GI bleed [K92.2]  Alcohol withdrawal syndrome without complication (HCC) [F10.930]  Alcohol use disorder [F10.90]    Subjective:    6/9 I called and discussed with Dr. Alcala vascular surgeon here recommended to start patient on Plavix prior to discharge and monitor his hemoglobin.  I also discussed with Dr. Larson, if we can proceed with colonoscopy tomorrow since patient has been off the Plavix anyway since admission and we can start him on the Plavix tomorrow if colonoscopy is clear.  Questionable if he will be able to prep until tomorrow but will try.  I already discussed with patient and his girlfriend at bedside and he is agreeable to wait till tomorrow.  I explained to him all my discussions with the other physicians.    6/10 patient is doing well this morning he slept very well he drank all his GoLytely and has been going to the bathroom several times.  Awaiting colonoscopy today.  ROS: denies fever, chills, cp, sob, n/v, HA unless stated above.     metoprolol tartrate  50 mg Oral Daily    thiamine  100 mg IntraVENous BID    pantoprazole (PROTONIX) 40 mg in sodium chloride (PF) 0.9 % 10 mL injection  40 mg IntraVENous Q6H    buPROPion  150 mg Oral BID    [Held by provider] isosorbide mononitrate  30 mg Oral Daily    sodium chloride flush  5-40 mL IntraVENous 2 times per day    sodium chloride flush  5-40 mL IntraVENous 2 times per day    folic acid  1 mg Oral Daily    arformoterol tartrate  15 mcg Nebulization BID RT    budesonide  0.5 mg Nebulization BID RT    atorvastatin  80 mg Oral Nightly    sertraline  100 mg Oral Daily     sodium chloride, , PRN  sodium chloride, , PRN  sodium chloride, , PRN  ondansetron, 4 mg, Q8H PRN   Or  ondansetron, 4 mg, Q6H PRN  polyethylene glycol, 17 g, Daily PRN  acetaminophen, 650 mg, Q6H PRN   Or  acetaminophen, 650 mg, Q6H PRN  PHENobarbital, 16.2 mg, Q6H PRN  sodium chloride, , 
Admitting Date and Time: 6/6/2025  2:44 PM  Admit Dx: Acute upper GI hemorrhage [K92.2]  GI bleed [K92.2]  Alcohol withdrawal syndrome without complication (HCC) [F10.930]  Alcohol use disorder [F10.90]    Subjective:    6/9 I called and discussed with Dr. Alcala vascular surgeon here recommended to start patient on Plavix prior to discharge and monitor his hemoglobin.  I also discussed with Dr. Larson, if we can proceed with colonoscopy tomorrow since patient has been off the Plavix anyway since admission and we can start him on the Plavix tomorrow if colonoscopy is clear.  Questionable if he will be able to prep until tomorrow but will try.  I already discussed with patient and his girlfriend at bedside and he is agreeable to wait till tomorrow.  I explained to him all my discussions with the other physicians.    ROS: denies fever, chills, cp, sob, n/v, HA unless stated above.     metoprolol tartrate  50 mg Oral Daily    thiamine  100 mg IntraVENous BID    pantoprazole (PROTONIX) 40 mg in sodium chloride (PF) 0.9 % 10 mL injection  40 mg IntraVENous Q6H    buPROPion  150 mg Oral BID    [Held by provider] isosorbide mononitrate  30 mg Oral Daily    sodium chloride flush  5-40 mL IntraVENous 2 times per day    sodium chloride flush  5-40 mL IntraVENous 2 times per day    folic acid  1 mg Oral Daily    arformoterol tartrate  15 mcg Nebulization BID RT    budesonide  0.5 mg Nebulization BID RT    atorvastatin  80 mg Oral Nightly    sertraline  100 mg Oral Daily     sodium chloride, , PRN  sodium chloride, , PRN  sodium chloride flush, 5-40 mL, PRN  sodium chloride, , PRN  ondansetron, 4 mg, Q8H PRN   Or  ondansetron, 4 mg, Q6H PRN  polyethylene glycol, 17 g, Daily PRN  acetaminophen, 650 mg, Q6H PRN   Or  acetaminophen, 650 mg, Q6H PRN  PHENobarbital, 16.2 mg, Q6H PRN  sodium chloride flush, 5-40 mL, PRN  sodium chloride, , PRN  levalbuterol, 0.63 mg, Q6H PRN         Objective:    /71   Pulse 71   Temp 97.8 °F 
CLINICAL PHARMACY NOTE: MEDS TO BEDS    Total # of Prescriptions Filled: 4   The following medications were delivered to the patient:  Thiamine 100 mg  Pantoprazole 40 mg  Folic acid 1 mg  Sertraline 100 mg    Additional Documentation:  Lipitor too soon until 6/21/25  
Ofelia AMBRIZ called to room due to patient demanding to leave, wants IV access taken out, and being aggressive. Patient insists family is coming to pick him up. Family called to verify, sister denies coming, asks us to keep him in hospital if possible.   
PATIENT REFUSES TELEMETRY AND REFUSES ALL IV FLUIDS. AND GTTS.   
PROGRESS NOTE  By Narciso Larson M.D.    [facility]  Dr. Elvira Rand M.D.,  Dr. Sebastian Alcantara M.D.,   Dr. Dutch Small D.O.,  Dr. Loy Ames M.D.,  ARGENTINA StoreyO.,          Edward Eckroate  64 y.o.  male    SUBJECTIVE:  No new complaints including no abdominal pain.  Wife at bedside    OBJECTIVE:    BP (!) 141/89   Pulse 72   Temp 97.9 °F (36.6 °C) (Oral)   Resp 20   Ht 1.702 m (5' 7.01\")   Wt 93 kg (205 lb)   SpO2 96%   BMI 32.10 kg/m²     General: NAD/obese adult  male.  AAO x 3  HEENT: EOMI/anicteric sclera/moist oral mucosa  Neck: Supple with trachea midline  Chest: Symmetric excursion/nonlabored respirations  Cor: Regular  Abd.: Soft and obese  Extr.:  No significant peripheral edema  Skin: Warm and dry      DATA:       Lab Results   Component Value Date/Time    WBC 6.5 06/11/2025 06:45 AM    RBC 3.26 06/11/2025 06:45 AM    HGB 10.3 06/11/2025 06:45 AM    HCT 30.4 06/11/2025 06:45 AM    MCV 93.3 06/11/2025 06:45 AM    MCH 31.6 06/11/2025 06:45 AM    MCHC 33.9 06/11/2025 06:45 AM    RDW 12.4 06/11/2025 06:45 AM     06/11/2025 06:45 AM    MPV 10.4 06/11/2025 06:45 AM     Lab Results   Component Value Date/Time     06/11/2025 06:45 AM    K 3.8 06/11/2025 06:45 AM    CL 99 06/11/2025 06:45 AM    CO2 24 06/11/2025 06:45 AM    BUN 5 06/11/2025 06:45 AM    CREATININE 0.8 06/11/2025 06:45 AM    CALCIUM 8.5 06/11/2025 06:45 AM    BILITOT 0.4 06/11/2025 06:45 AM    ALKPHOS 103 06/11/2025 06:45 AM    AST 29 06/11/2025 06:45 AM    ALT 36 06/11/2025 06:45 AM     Lab Results   Component Value Date/Time    LIPASE 27 08/31/2015 09:45 AM     No results found for: \"AMYLASE\"      ASSESSMENT/PLAN:  Patient Active Problem List   Diagnosis    Alcohol dependence with delirium (HCC)    Hypertension    Hyperlipidemia    PVD (peripheral vascular disease) with claudication    PVD (peripheral vascular disease)    GI bleed    Alcoholism (HCC)    Atherosclerosis of superficial 
PROGRESS NOTE  By Naricso Larson M.D.    [facility]  Dr. Elvira Rand M.D.,  Dr. Sebastian Alcantara M.D.,   Dr. Dutch Small D.O.,  Dr. Loy Ames M.D.,  ARGENTINA StoreyO.,          Edward Eckroate  64 y.o.  male    SUBJECTIVE:  No further bleeding.  Denies nausea vomiting.  Wife at bedside    OBJECTIVE:    BP (!) 171/68   Pulse 82   Temp 97.2 °F (36.2 °C) (Oral)   Resp 22   Ht 1.702 m (5' 7.01\")   Wt 93 kg (205 lb)   SpO2 98%   BMI 32.10 kg/m²     General: NAD/adult  male.  AAO x 3  HEENT: Anicteric sclera/moist oral mucosa  Neck: Supple with trachea midline  Chest: Metric excursion/nonlabored respirations  Cor: Regular  Abd.: Soft and obese.  Nontender  Extr.:  No significant peripheral edema  Skin: Warm and dry/anicteric  Other:      DATA:    Monitor data reviewed -leads off       Lab Results   Component Value Date/Time    WBC 7.1 06/09/2025 05:54 AM    RBC 2.87 06/09/2025 05:54 AM    HGB 9.1 06/09/2025 05:54 AM    HCT 26.4 06/09/2025 05:54 AM    MCV 92.0 06/09/2025 05:54 AM    MCH 31.7 06/09/2025 05:54 AM    MCHC 34.5 06/09/2025 05:54 AM    RDW 12.1 06/09/2025 05:54 AM     06/09/2025 05:54 AM    MPV 9.9 06/09/2025 05:54 AM     Lab Results   Component Value Date/Time     06/09/2025 05:54 AM    K 3.9 06/09/2025 05:54 AM    CL 95 06/09/2025 05:54 AM    CO2 23 06/09/2025 05:54 AM    BUN 6 06/09/2025 05:54 AM    CREATININE 0.8 06/09/2025 05:54 AM    CALCIUM 8.6 06/09/2025 05:54 AM    BILITOT 0.5 06/09/2025 05:54 AM    ALKPHOS 79 06/09/2025 05:54 AM    AST 26 06/09/2025 05:54 AM    ALT 27 06/09/2025 05:54 AM     Lab Results   Component Value Date/Time    LIPASE 27 08/31/2015 09:45 AM     No results found for: \"AMYLASE\"      ASSESSMENT/PLAN:  Patient Active Problem List   Diagnosis    Alcohol dependence with delirium (HCC)    Hypertension    Hyperlipidemia    PVD (peripheral vascular disease) with claudication    PVD (peripheral vascular disease)    GI bleed    
Patient refusing to wear heart monitor and to have IV infusions.   
Patient seen and examined prior to procedure.  Wife at bedside.  Tolerated prep well with stool clear.  H&P reviewed -no new changes except as described above.  Vitals:    06/10/25 0759   BP: (!) 155/59   Pulse: 70   Resp: 16   Temp: 97.9 °F (36.6 °C)   SpO2: 96%       Plan: Proceed with colonoscopy later today.  Please, see orders for plan of care.  Thank you    Narciso Larson MD    
Sbar completed and placed on chart. Chart with patient in transit.   
Spiritual Health History and Assessment/Progress Note  [facility]     Encounter, Rituals, Rites and Sacraments,  ,  ,      Name: Bobby Dubose MRN: [id number]    Age: 64 y.o.     Sex: male   Language: English   Adventist: Evangelical   GI bleed     Date: 6/11/2025                           Spiritual Assessment began in [facility]/ICU        Referral/Consult From: Rounding   Encounter Overview/Reason:  Encounter, Rituals, Rites and Sacraments  Service Provided For: Patient    Barbara, Belief, Meaning:   Patient is connected with a barbara tradition or spiritual practice  Family/Friends are connected with a barbara tradition or spiritual practice      Importance and Influence:  Patient has no beliefs influential to healthcare decision-making identified during this visit  Family/Friends have no beliefs influential to healthcare decision-making identified during this visit    Community:  Patient feels well-supported. Support system includes: Friends and Extended family  Family/Friends feel well-supported. Support system includes: Extended family    Assessment and Plan of Care:     Patient Interventions include: Facilitated expression of thoughts and feelings, Affirmed coping skills/support systems, and Provided sacramental/Congregational ritual  Family/Friends Interventions include: Facilitated expression of thoughts and feelings and Affirmed coping skills/support systems    Patient Plan of Care: Spiritual Care available upon further referral  Family/Friends Plan of Care: Spiritual Care available upon further referral    Electronically signed by Chaplain Arik on 6/11/2025 at 4:33 PM   
This RN assumed care of this pt at 0901.  
q6h  -octreotide infusion  -LR@100ml/hr  -NG tube per GI  -NPO  -H&H q6h  -GI ordered hemoccult & iron panel     Alcohol use disorder  -pt reports ~6 beers/day  -last alcohol reportedly on 6/3  -received phenobarb in ER  -CIWA protocol with phenobarb taper & prn  -seizure precautions  -Thiamine 100 mg IV twice daily then 100 mg oral daily thereafter  - Phenobarbital taper by IV  -Check ammonia and GGT  -Check B12 and folate levels     HTN by hx  -hold home antihypertensives for hypotension   -monitor BP     HLD  -home statin     COPD --stable  -continue home aerosols  -xopenex q6h prn     Diarrhea-possibly C. difficile however patient has not been on antibiotics really recently  -Stool studies ordered by GI  -Watch stool output     Code Status: Full Code  DVT prophylaxis: SCD's     Disposition: From home.  Discharge in about 3 to 4 days with home health care.  Also consider inpatient rehab for alcoholism.   to see.      NOTE: This report was transcribed using voice recognition software. Every effort was made to ensure accuracy; however, inadvertent computerized transcription errors may be present.  Electronically signed by Aidee Mchugh DO on 6/7/2025 at 11:04 AM    
start metoprolol now and consider adding the rest of her regimen including lisinopril and Imdur tomorrow if she tolerates  Hyperlipidemia continue statin  COPD no exacerbation continue home aerosols  Diarrhea watching output and GI to follow-up on stool studies.  Note no recent antibiotics  Full code  DVT prophylaxis SCDs  Disposition will go to own home in approximately 2 days with home health.  Whether he reconsiders and goes to inpatient alcohol rehab or not he will need ongoing 12-step meetings daily    NOTE: This report was transcribed using voice recognition software. Every effort was made to ensure accuracy; however, inadvertent computerized transcription errors may be present.     Electronically signed by MANUEL ESPINOZA MD on 6/8/2025 at 7:25 AM

## 2025-06-13 LAB — SURGICAL PATHOLOGY REPORT: NORMAL

## 2025-06-14 NOTE — PROGRESS NOTES
Vascular Surgery Outpatient Progress Note      No chief complaint on file.      HISTORY OF PRESENT ILLNESS:                The patient is a 64 y.o. male who returns for follow-up evaluation of peripheral artery disease.  He has extreme short distance claudication, I performed right femoralpopliteal segment shockwave lithotripsy with DCB on June 4, 2025.  After his procedure, he was admitted at [facility] with GI bleed, found to have gastritis on endoscopy.  From angiogram for indication of short distance claudication.  Returns today for follow-up.  He has been taking his Plavix.  He is a former smoker.  He has not walked very far yet so he does not know if his claudications symptoms resolved, he had some left groin access site ecchymosis which is resolving.    Past Medical History:        Diagnosis Date    Alcohol abuse 08/31/2015    COPD (chronic obstructive pulmonary disease) (HCC)     Hyperlipidemia     Hypertension      Past Surgical History:        Procedure Laterality Date    APPENDECTOMY      BACK SURGERY      COLONOSCOPY N/A 6/10/2025    COLONOSCOPY DIAGNOSTIC performed by Augusto Shi MD at [facility] ENDOSCOPY    INVASIVE VASCULAR N/A 6/4/2025    Aortagram abdominal performed by Elvira Hagen MD at [facility] CARDIAC CATH LAB    INVASIVE VASCULAR N/A 6/4/2025    Pta femoral popliteal artery performed by Elvira Hagen MD at [facility] CARDIAC CATH LAB    ROTATOR CUFF REPAIR      UPPER GASTROINTESTINAL ENDOSCOPY N/A 6/8/2025    ESOPHAGOGASTRODUODENOSCOPY BIOPSY performed by Sebastian Alcantara MD at [facility] ENDOSCOPY     Current Medications:   Prior to Admission medications    Medication Sig Start Date End Date Taking? Authorizing Provider   sertraline (ZOLOFT) 100 MG tablet Take 1 tablet by mouth daily 6/11/25   Indira Coates MD   atorvastatin (LIPITOR) 80 MG tablet Take 1 tablet by mouth nightly 6/11/25   Indira Coates MD   folic acid (FOLVITE) 1 MG tablet Take 1 tablet by mouth daily 6/12/25   Indira Coates MD

## 2025-06-16 ENCOUNTER — OFFICE VISIT (OUTPATIENT)
Dept: VASCULAR SURGERY | Age: 65
End: 2025-06-16
Payer: COMMERCIAL

## 2025-06-16 VITALS — BODY MASS INDEX: 32.1 KG/M2 | WEIGHT: 205 LBS

## 2025-06-16 DIAGNOSIS — I73.9 PAD (PERIPHERAL ARTERY DISEASE): Primary | ICD-10-CM

## 2025-06-16 DIAGNOSIS — Z87.891 HISTORY OF CIGARETTE SMOKING: ICD-10-CM

## 2025-06-16 PROCEDURE — 99214 OFFICE O/P EST MOD 30 MIN: CPT | Performed by: STUDENT IN AN ORGANIZED HEALTH CARE EDUCATION/TRAINING PROGRAM

## 2025-06-25 ENCOUNTER — TELEPHONE (OUTPATIENT)
Dept: VASCULAR SURGERY | Age: 65
End: 2025-06-25

## 2025-06-25 NOTE — TELEPHONE ENCOUNTER
Patient would like to proceed with angiogram. Scheduled angiogram on Wed, 7-2-25. Report to [facility] at 7:30 am. NPO after midnight except for heart and/or BP meds with sips of water.

## 2025-06-27 ENCOUNTER — TELEPHONE (OUTPATIENT)
Dept: VASCULAR SURGERY | Age: 65
End: 2025-06-27

## 2025-06-27 NOTE — TELEPHONE ENCOUNTER
Received life and disability benefit papers from [employer].  Spoke with patient, this paperwork is for his upcoming  procedure with Dr. Hagen on 7-2-25.

## 2025-07-02 ENCOUNTER — HOSPITAL ENCOUNTER (OUTPATIENT)
Age: 65
Discharge: HOME | End: 2025-07-02
Attending: STUDENT IN AN ORGANIZED HEALTH CARE EDUCATION/TRAINING PROGRAM | Admitting: STUDENT IN AN ORGANIZED HEALTH CARE EDUCATION/TRAINING PROGRAM
Payer: COMMERCIAL

## 2025-07-02 VITALS
HEIGHT: 68 IN | OXYGEN SATURATION: 97 % | HEART RATE: 75 BPM | WEIGHT: 200 LBS | TEMPERATURE: 97.2 F | SYSTOLIC BLOOD PRESSURE: 157 MMHG | BODY MASS INDEX: 30.31 KG/M2 | RESPIRATION RATE: 18 BRPM | DIASTOLIC BLOOD PRESSURE: 65 MMHG

## 2025-07-02 DIAGNOSIS — I73.9 PERIPHERAL VASCULAR DISEASE, UNSPECIFIED: ICD-10-CM

## 2025-07-02 LAB
ABO + RH BLD: NORMAL
ANION GAP SERPL CALCULATED.3IONS-SCNC: 10 MMOL/L (ref 7–16)
ARM BAND NUMBER: NORMAL
BLOOD BANK SAMPLE EXPIRATION: NORMAL
BLOOD GROUP ANTIBODIES SERPL: NEGATIVE
BUN SERPL-MCNC: 7 MG/DL (ref 8–23)
CALCIUM SERPL-MCNC: 9.2 MG/DL (ref 8.8–10.2)
CHLORIDE SERPL-SCNC: 101 MMOL/L (ref 98–107)
CO2 SERPL-SCNC: 28 MMOL/L (ref 22–29)
CREAT SERPL-MCNC: 0.8 MG/DL (ref 0.7–1.2)
ECHO BSA: 2.08 M2
ERYTHROCYTE [DISTWIDTH] IN BLOOD BY AUTOMATED COUNT: 12.8 % (ref 11.5–15)
GFR, ESTIMATED: >90 ML/MIN/1.73M2
GLUCOSE SERPL-MCNC: 132 MG/DL (ref 74–99)
HCT VFR BLD AUTO: 37.2 % (ref 37–54)
HGB BLD-MCNC: 12.8 G/DL (ref 12.5–16.5)
MCH RBC QN AUTO: 31.1 PG (ref 26–35)
MCHC RBC AUTO-ENTMCNC: 34.4 G/DL (ref 32–34.5)
MCV RBC AUTO: 90.5 FL (ref 80–99.9)
PLATELET # BLD AUTO: 242 K/UL (ref 130–450)
PMV BLD AUTO: 9.3 FL (ref 7–12)
POTASSIUM SERPL-SCNC: 4.5 MMOL/L (ref 3.5–5.1)
RBC # BLD AUTO: 4.11 M/UL (ref 3.8–5.8)
SODIUM SERPL-SCNC: 138 MMOL/L (ref 136–145)
WBC OTHER # BLD: 5.4 K/UL (ref 4.5–11.5)

## 2025-07-02 PROCEDURE — 86850 RBC ANTIBODY SCREEN: CPT

## 2025-07-02 PROCEDURE — 6360000004 HC RX CONTRAST MEDICATION: Performed by: STUDENT IN AN ORGANIZED HEALTH CARE EDUCATION/TRAINING PROGRAM

## 2025-07-02 PROCEDURE — 86901 BLOOD TYPING SEROLOGIC RH(D): CPT

## 2025-07-02 PROCEDURE — 2709999900 HC NON-CHARGEABLE SUPPLY: Performed by: STUDENT IN AN ORGANIZED HEALTH CARE EDUCATION/TRAINING PROGRAM

## 2025-07-02 PROCEDURE — 7100000011 HC PHASE II RECOVERY - ADDTL 15 MIN: Performed by: STUDENT IN AN ORGANIZED HEALTH CARE EDUCATION/TRAINING PROGRAM

## 2025-07-02 PROCEDURE — C1760 CLOSURE DEV, VASC: HCPCS | Performed by: STUDENT IN AN ORGANIZED HEALTH CARE EDUCATION/TRAINING PROGRAM

## 2025-07-02 PROCEDURE — C1769 GUIDE WIRE: HCPCS | Performed by: STUDENT IN AN ORGANIZED HEALTH CARE EDUCATION/TRAINING PROGRAM

## 2025-07-02 PROCEDURE — 86900 BLOOD TYPING SEROLOGIC ABO: CPT

## 2025-07-02 PROCEDURE — 2500000003 HC RX 250 WO HCPCS: Performed by: NURSE PRACTITIONER

## 2025-07-02 PROCEDURE — C1887 CATHETER, GUIDING: HCPCS | Performed by: STUDENT IN AN ORGANIZED HEALTH CARE EDUCATION/TRAINING PROGRAM

## 2025-07-02 PROCEDURE — 85027 COMPLETE CBC AUTOMATED: CPT

## 2025-07-02 PROCEDURE — 37224 HC FEM POP TERRITORY PLASTY: CPT | Performed by: STUDENT IN AN ORGANIZED HEALTH CARE EDUCATION/TRAINING PROGRAM

## 2025-07-02 PROCEDURE — 6370000000 HC RX 637 (ALT 250 FOR IP): Performed by: STUDENT IN AN ORGANIZED HEALTH CARE EDUCATION/TRAINING PROGRAM

## 2025-07-02 PROCEDURE — 6360000002 HC RX W HCPCS: Performed by: NURSE PRACTITIONER

## 2025-07-02 PROCEDURE — 80048 BASIC METABOLIC PNL TOTAL CA: CPT

## 2025-07-02 PROCEDURE — C1894 INTRO/SHEATH, NON-LASER: HCPCS | Performed by: STUDENT IN AN ORGANIZED HEALTH CARE EDUCATION/TRAINING PROGRAM

## 2025-07-02 PROCEDURE — C1725 CATH, TRANSLUMIN NON-LASER: HCPCS | Performed by: STUDENT IN AN ORGANIZED HEALTH CARE EDUCATION/TRAINING PROGRAM

## 2025-07-02 PROCEDURE — 7100000010 HC PHASE II RECOVERY - FIRST 15 MIN: Performed by: STUDENT IN AN ORGANIZED HEALTH CARE EDUCATION/TRAINING PROGRAM

## 2025-07-02 PROCEDURE — 6360000002 HC RX W HCPCS: Performed by: STUDENT IN AN ORGANIZED HEALTH CARE EDUCATION/TRAINING PROGRAM

## 2025-07-02 PROCEDURE — 75710 ARTERY X-RAYS ARM/LEG: CPT | Performed by: STUDENT IN AN ORGANIZED HEALTH CARE EDUCATION/TRAINING PROGRAM

## 2025-07-02 PROCEDURE — C2623 CATH, TRANSLUMIN, DRUG-COAT: HCPCS | Performed by: STUDENT IN AN ORGANIZED HEALTH CARE EDUCATION/TRAINING PROGRAM

## 2025-07-02 PROCEDURE — 76937 US GUIDE VASCULAR ACCESS: CPT | Performed by: STUDENT IN AN ORGANIZED HEALTH CARE EDUCATION/TRAINING PROGRAM

## 2025-07-02 PROCEDURE — 75774 ARTERY X-RAY EACH VESSEL: CPT | Performed by: STUDENT IN AN ORGANIZED HEALTH CARE EDUCATION/TRAINING PROGRAM

## 2025-07-02 DEVICE — ANGIO-SEAL VIP VASCULAR CLOSURE DEVICE
Type: IMPLANTABLE DEVICE | Status: FUNCTIONAL
Brand: ANGIO-SEAL

## 2025-07-02 RX ORDER — ONDANSETRON 2 MG/ML
4 INJECTION INTRAMUSCULAR; INTRAVENOUS EVERY 8 HOURS PRN
Status: DISCONTINUED | OUTPATIENT
Start: 2025-07-02 | End: 2025-07-02 | Stop reason: HOSPADM

## 2025-07-02 RX ORDER — MIDAZOLAM HYDROCHLORIDE 1 MG/ML
INJECTION, SOLUTION INTRAMUSCULAR; INTRAVENOUS PRN
Status: DISCONTINUED | OUTPATIENT
Start: 2025-07-02 | End: 2025-07-02 | Stop reason: HOSPADM

## 2025-07-02 RX ORDER — FENTANYL CITRATE 50 UG/ML
INJECTION, SOLUTION INTRAMUSCULAR; INTRAVENOUS PRN
Status: DISCONTINUED | OUTPATIENT
Start: 2025-07-02 | End: 2025-07-02 | Stop reason: HOSPADM

## 2025-07-02 RX ORDER — CLOPIDOGREL BISULFATE 75 MG/1
TABLET ORAL PRN
Status: DISCONTINUED | OUTPATIENT
Start: 2025-07-02 | End: 2025-07-02 | Stop reason: HOSPADM

## 2025-07-02 RX ORDER — SODIUM CHLORIDE 9 MG/ML
INJECTION, SOLUTION INTRAVENOUS PRN
Status: DISCONTINUED | OUTPATIENT
Start: 2025-07-02 | End: 2025-07-02 | Stop reason: HOSPADM

## 2025-07-02 RX ORDER — ACETAMINOPHEN 325 MG/1
650 TABLET ORAL EVERY 4 HOURS PRN
Status: DISCONTINUED | OUTPATIENT
Start: 2025-07-02 | End: 2025-07-02 | Stop reason: HOSPADM

## 2025-07-02 RX ORDER — IOPAMIDOL 612 MG/ML
INJECTION, SOLUTION INTRAVASCULAR PRN
Status: DISCONTINUED | OUTPATIENT
Start: 2025-07-02 | End: 2025-07-02 | Stop reason: HOSPADM

## 2025-07-02 RX ORDER — SODIUM CHLORIDE 0.9 % (FLUSH) 0.9 %
5-40 SYRINGE (ML) INJECTION EVERY 12 HOURS SCHEDULED
Status: DISCONTINUED | OUTPATIENT
Start: 2025-07-02 | End: 2025-07-02 | Stop reason: HOSPADM

## 2025-07-02 RX ORDER — HEPARIN SODIUM 1000 [USP'U]/ML
INJECTION, SOLUTION INTRAVENOUS; SUBCUTANEOUS PRN
Status: DISCONTINUED | OUTPATIENT
Start: 2025-07-02 | End: 2025-07-02 | Stop reason: HOSPADM

## 2025-07-02 RX ORDER — SODIUM CHLORIDE 0.9 % (FLUSH) 0.9 %
5-40 SYRINGE (ML) INJECTION PRN
Status: DISCONTINUED | OUTPATIENT
Start: 2025-07-02 | End: 2025-07-02 | Stop reason: HOSPADM

## 2025-07-02 RX ORDER — SODIUM CHLORIDE 9 MG/ML
INJECTION, SOLUTION INTRAVENOUS CONTINUOUS
Status: DISCONTINUED | OUTPATIENT
Start: 2025-07-02 | End: 2025-07-02 | Stop reason: HOSPADM

## 2025-07-02 RX ADMIN — CEFAZOLIN 2000 MG: 1 INJECTION, POWDER, FOR SOLUTION INTRAMUSCULAR; INTRAVENOUS at 09:10

## 2025-07-02 ASSESSMENT — PAIN - FUNCTIONAL ASSESSMENT
PAIN_FUNCTIONAL_ASSESSMENT: NONE - DENIES PAIN
PAIN_FUNCTIONAL_ASSESSMENT: NONE - DENIES PAIN

## 2025-07-02 NOTE — H&P
Vascular Surgery History & Physical Exam      Chief Complaint: Peripheral vascular disease, lifestyle limiting claudication    HISTORY OF PRESENT ILLNESS:                The patient is a 64 y.o. male who presents to the hospital for elective arteriogram with possible intervention.  The patient has a history of peripheral vascular disease and LLE lifestyle limiting claudication.      IMPRESSION:    Active Hospital Problems    Diagnosis     PVD (peripheral vascular disease) [I73.9]      Priority: High       PLAN:  Aortogram,  Bilateral lower extremity arteriogram, possible intervention.    I reviewed the procedure with the patient.  I discussed the risks, benefits, and alternatives of the procedure.  The patient understands and consents.  All questions were answered.    Patient Active Problem List   Diagnosis Code    Alcohol dependence with delirium ([facility]) F10.221    Hypertension I10    Hyperlipidemia E78.5    PVD (peripheral vascular disease) with claudication I73.9    PVD (peripheral vascular disease) I73.9    GI bleed K92.2    Alcoholism ([facility]) F10.20    Atherosclerosis of superficial femoral artery I70.209    S/P peripheral artery angioplasty Z98.62       Past Medical History:   Diagnosis Date    Alcohol abuse 08/31/2015    COPD (chronic obstructive pulmonary disease) ([facility])     Hyperlipidemia     Hypertension         Past Surgical History:   Procedure Laterality Date    APPENDECTOMY      BACK SURGERY      COLONOSCOPY N/A 6/10/2025    COLONOSCOPY DIAGNOSTIC performed by Augusto Shi MD at [facility] ENDOSCOPY    INVASIVE VASCULAR N/A 6/4/2025    Aortagram abdominal performed by Elvira Hagen MD at [facility] CARDIAC CATH LAB    INVASIVE VASCULAR N/A 6/4/2025    Pta femoral popliteal artery performed by Elvira Hagen MD at [facility] CARDIAC CATH LAB    ROTATOR CUFF REPAIR      UPPER GASTROINTESTINAL ENDOSCOPY N/A 6/8/2025    ESOPHAGOGASTRODUODENOSCOPY BIOPSY performed by Sebastian Alcantara MD at [facility] ENDOSCOPY       Current

## 2025-07-02 NOTE — POST SEDATION
Sedation Post Procedure Note    Patient Name: Bobby Dubose   YOB: 1960  Room/Bed: Cath Pool Room/  Medical Record Number: [id number]  Date: 7/2/2025   Time: 10:30 AM         Physicians/Assistants: Elvira Hagen MD, MD    Procedure Performed:  angiogram    Post-Sedation Vital Signs:  Vitals:    07/02/25 0905   BP:    Pulse:    Resp:    Temp:    SpO2: 100%      Vital signs were reviewed and were stable after the procedure (see flow sheet for vitals)            Post-Sedation Exam: [id number]           Complications: none    Electronically signed by Elvira Hagen MD on 7/2/2025 at 10:30 AM

## 2025-07-02 NOTE — PRE SEDATION
Sedation Plan  ASA: class 3 - patient with severe systemic disease     Mallampati class: III - soft palate, base of uvula visible.    Sedation plan: local anesthesia, level 2-1: moderate/analgesia (conscious sedation), moderate (conscious sedation) and minimal sedation    Risks, benefits, and alternatives discussed with patient.        Immediate reassessment prior to sedation:  Patient's status reviewed and vital signs assessed; acceptable to perform procedure and proceed to administer sedation as planned.

## 2025-07-02 NOTE — DISCHARGE INSTRUCTIONS
Discharge Instructions for Lower extremity angiogram    Call Dr. Hagen's office [phone] for follow-up appointment.    Groin Care  - keep clean and dry  - ok to shower or sponge bath  - ok to clean site with lukewarm water and mild soap  - use a soft wash cloth to gently wipe the incision area  - do not scrub the incision areas  - no swimming or baths    Home Care    Follow these guidelines after surgery:   Rest. Try to move as tolerated. A mix of rest and light activity improves healing.   The incision area may be sore for a few days. To minimize pain and soreness:   Take pain medicine as directed.   Avoid strenuous activity and heavy lifting.       Diet    You can return to your regular diet. You may work with a dietician who will help you follow a heart-healthy diet.   Physical Activity    You will feel sore after the surgery. Try to walk steadily within two weeks. You may be able to return to normal activities within 1-3 weeks. While recovering, you will need to avoid strenuous activities, like heavy lifting.   Ask your doctor when you will be able to return to work.    Do not drive unless your doctor has given you permission to do so.    Medications    Your doctor may recommend:   Over-the-counter or prescription pain medicine   Aspirin or a cholesterol-lowering drug to prevent complications   If you had to stop medicines before the procedure, ask your doctor when you can start again. Medicines that may have been stopped include:   Anti-inflammatory drugs (eg, aspirin, ibuprofen)   Blood thinners, like warfarin (Coumadin)   Clopidogrel (Plavix)   When taking medicines:   Take your medicine as directed. Do not change the amount or the schedule.   Do not stop taking them without talking to your doctor.   Do not share them.   Know what results and side effects to look for. Report them to your doctor.   Some drugs can be dangerous when mixed. Talk to a doctor or pharmacist if you are taking more than one drug.

## 2025-07-02 NOTE — OP NOTE
Operative Note      Patient: Bobby Dubose  YOB: 1960  MRN: [id number]    Date of Procedure: 7/2/2025    Pre-Op Diagnosis Codes:      * Peripheral vascular disease, unspecified [I73.9]    Post-Op Diagnosis: Same       Procedure(s):  Aortagram abdominal  Angioplasty peripheral artery  Intravascular lithotripsy ( peripheral)  Ultrasound-guided access of the right common femoral artery with static images saved to our system  Left lower extremity angiogram, selective, final catheter position at common femoral artery, popliteal artery  Left superficial femoral artery and popliteal artery lithotripsy with 6 x 80 mm shockwave balloon, angioplasty with 5 x 250 mm Admiral Impact drug-coated balloon, 6 x 250 mm Admiral Impact drug-coated balloon  Conscious moderate sedation for a total of 60 minutes using a combination of Versed and fentanyl, administered by a dedicated cardiovascular nurse at my request.  Constant hemodynamic monitoring was performed during this encounter.  I performed continuous face-to-face assessment during this counter.  As facilitated provider responsible for conscious moderate sedation.    Surgeon(s):  Elvira Hagen MD    Assistant:   * No surgical staff found *    Anesthesia: None    Estimated Blood Loss (mL): Minimal    Complications: None    Specimens:   * No specimens in log *    Implants:  Implant Name Type Inv. Item Serial No.  Lot No. LRB No. Used Action   DEVICE CLSR ANGIO-SEAL VIP 6FR 0.035IN V TWST INTEGR PLATFRM - [id number]  DEVICE CLSR ANGIO-SEAL VIP 6FR 0.035IN V TWST INTEGR PLATFRM  [facility] [id number]-[id number] N/A 1 Implanted         Drains:   [REMOVED] NG/OG/NJ/NE Tube Nasogastric Left nostril (Removed)   Surrounding Skin Clean, dry & intact 06/08/25 0800   Securement device Tape 06/08/25 0800   Status Other (Comment) 06/08/25 1000   Placement Verified X-Ray (Initial) 06/06/25 2233   NG/OG/NJ/NE External Measurement (cm) 59 cm 06/08/25 0800   Drainage

## 2025-07-02 NOTE — PROGRESS NOTES
Patient brought out to CVL holding area post abdominal aortogram with intervention. Vitals stable. A&Ox4. Tolerating PO intake. Right femoral site clean, dry and intact. No bleeding or hematoma.

## 2025-07-02 NOTE — PROGRESS NOTES
Patient up ambulated in [place] very well. Right groin site soft, without oozing or hematoma. Dressing clean and dry. IV removed. Discharge instructions given, patient verbalizes understanding. Patient discharged to home.

## 2025-07-23 ENCOUNTER — HOSPITAL ENCOUNTER (OUTPATIENT)
Dept: CARDIOLOGY | Age: 65
Discharge: HOME | End: 2025-07-25
Attending: STUDENT IN AN ORGANIZED HEALTH CARE EDUCATION/TRAINING PROGRAM
Payer: COMMERCIAL

## 2025-07-23 DIAGNOSIS — I73.9 PAD (PERIPHERAL ARTERY DISEASE): ICD-10-CM

## 2025-07-23 LAB
VAS LEFT ABI: 0.64
VAS LEFT ARM BP: 157 MMHG
VAS LEFT DORSALIS PEDIS BP: 105 MMHG
VAS LEFT PTA BP: 104 MMHG
VAS LEFT TBI: 0.61
VAS LEFT TOE PRESSURE: 100 MMHG
VAS RIGHT ABI: 0.79
VAS RIGHT ARM BP: 165 MMHG
VAS RIGHT DORSALIS PEDIS BP: 130 MMHG
VAS RIGHT PTA BP: 127 MMHG
VAS RIGHT TBI: 0.67
VAS RIGHT TOE PRESSURE: 111 MMHG

## 2025-07-23 PROCEDURE — 93923 UPR/LXTR ART STDY 3+ LVLS: CPT

## 2025-07-26 NOTE — PROGRESS NOTES
Vascular Surgery Outpatient Progress Note      No chief complaint on file.      HISTORY OF PRESENT ILLNESS:                The patient is a 64 y.o. male who returns for follow-up evaluation of bilateral lower extremity endovascular intervention.  I performed right lower extremity femoral-popliteal segment shockwave/angioplasty on June 4, 2025, I subsequently performed left lower extremity femoral-popliteal segment shockwave/angioplasty on July 2, 2025.  Indication was for short distance claudication.  Of note, patient with extensive tibioperoneal disease as well.  On the left side I was not completely satisfied with his result given how his mid SFA reacted to lithotripsy.  Patient is doing well, he does say that he has a little bit of left groin discomfort.  He has been mobilizing and reports that right lower extremity feels better, left lower extremity difficult to say, he has clicking sensation in his right groin that is positional.  He says it is hard to get up and move around, once he starts moving and walking makes it feel better.  He has been taking his Plavix.  Regarding smoking, continues to take a puff here and there.    Past Medical History:        Diagnosis Date    Alcohol abuse 08/31/2015    COPD (chronic obstructive pulmonary disease) (HCC)     Hyperlipidemia     Hypertension      Past Surgical History:        Procedure Laterality Date    APPENDECTOMY      BACK SURGERY      CARDIAC PROCEDURE N/A 7/2/2025    Intravascular lithotripsy ( peripheral) performed by Elvira Hagen MD at [facility] CARDIAC CATH LAB    COLONOSCOPY N/A 6/10/2025    COLONOSCOPY DIAGNOSTIC performed by Augusto Shi MD at [facility] ENDOSCOPY    INVASIVE VASCULAR N/A 6/4/2025    Aortagram abdominal performed by Elvira Hagen MD at [facility] CARDIAC CATH LAB    INVASIVE VASCULAR N/A 6/4/2025    Pta femoral popliteal artery performed by Elvira Hagen MD at [facility] CARDIAC CATH LAB    INVASIVE VASCULAR N/A 7/2/2025    Aortagram abdominal performed by

## 2025-07-28 ENCOUNTER — OFFICE VISIT (OUTPATIENT)
Dept: VASCULAR SURGERY | Age: 65
End: 2025-07-28
Payer: COMMERCIAL

## 2025-07-28 DIAGNOSIS — F17.200 SMOKER: ICD-10-CM

## 2025-07-28 DIAGNOSIS — I73.9 PAD (PERIPHERAL ARTERY DISEASE): Primary | ICD-10-CM

## 2025-07-28 PROCEDURE — 99214 OFFICE O/P EST MOD 30 MIN: CPT | Performed by: STUDENT IN AN ORGANIZED HEALTH CARE EDUCATION/TRAINING PROGRAM

## 2025-07-28 PROCEDURE — G2211 COMPLEX E/M VISIT ADD ON: HCPCS | Performed by: STUDENT IN AN ORGANIZED HEALTH CARE EDUCATION/TRAINING PROGRAM

## 2025-07-28 PROCEDURE — 99406 BEHAV CHNG SMOKING 3-10 MIN: CPT | Performed by: STUDENT IN AN ORGANIZED HEALTH CARE EDUCATION/TRAINING PROGRAM

## 2025-07-28 RX ORDER — CLOPIDOGREL BISULFATE 75 MG/1
75 TABLET ORAL DAILY
Qty: 90 TABLET | Refills: 1 | Status: SHIPPED | OUTPATIENT
Start: 2025-07-28

## 2025-08-04 ENCOUNTER — HOSPITAL ENCOUNTER (EMERGENCY)
Age: 65
Discharge: HOME | End: 2025-08-04
Attending: EMERGENCY MEDICINE
Payer: COMMERCIAL

## 2025-08-04 VITALS
TEMPERATURE: 98.2 F | RESPIRATION RATE: 20 BRPM | DIASTOLIC BLOOD PRESSURE: 76 MMHG | SYSTOLIC BLOOD PRESSURE: 152 MMHG | HEART RATE: 66 BPM | OXYGEN SATURATION: 95 %

## 2025-08-04 DIAGNOSIS — S81.811A LACERATION OF RIGHT LOWER EXTREMITY EXCLUDING THIGH, INITIAL ENCOUNTER: Primary | ICD-10-CM

## 2025-08-04 PROCEDURE — 6360000002 HC RX W HCPCS

## 2025-08-04 PROCEDURE — 90471 IMMUNIZATION ADMIN: CPT

## 2025-08-04 PROCEDURE — 90714 TD VACC NO PRESV 7 YRS+ IM: CPT

## 2025-08-04 PROCEDURE — 99283 EMERGENCY DEPT VISIT LOW MDM: CPT

## 2025-08-04 RX ORDER — CEPHALEXIN 500 MG/1
500 CAPSULE ORAL 4 TIMES DAILY
Qty: 40 CAPSULE | Refills: 0 | Status: SHIPPED | OUTPATIENT
Start: 2025-08-04 | End: 2025-08-14

## 2025-08-04 RX ADMIN — CLOSTRIDIUM TETANI TOXOID ANTIGEN (FORMALDEHYDE INACTIVATED) AND CORYNEBACTERIUM DIPHTHERIAE TOXOID ANTIGEN (FORMALDEHYDE INACTIVATED) 0.5 ML: 5; 2 INJECTION, SUSPENSION INTRAMUSCULAR at 18:31

## 2025-08-04 ASSESSMENT — LIFESTYLE VARIABLES
HOW OFTEN DO YOU HAVE A DRINK CONTAINING ALCOHOL: NEVER
HOW MANY STANDARD DRINKS CONTAINING ALCOHOL DO YOU HAVE ON A TYPICAL DAY: PATIENT DOES NOT DRINK
HOW OFTEN DO YOU HAVE A DRINK CONTAINING ALCOHOL: 4 OR MORE TIMES A WEEK
HOW MANY STANDARD DRINKS CONTAINING ALCOHOL DO YOU HAVE ON A TYPICAL DAY: 5 OR 6

## 2025-08-15 LAB
VAS LEFT ABI: 0.64
VAS LEFT ARM BP: 157 MMHG
VAS LEFT DORSALIS PEDIS BP: 105 MMHG
VAS LEFT PTA BP: 104 MMHG
VAS LEFT TBI: 0.61
VAS LEFT TOE PRESSURE: 100 MMHG
VAS RIGHT ABI: 0.79
VAS RIGHT ARM BP: 165 MMHG
VAS RIGHT DORSALIS PEDIS BP: 130 MMHG
VAS RIGHT PTA BP: 127 MMHG
VAS RIGHT TBI: 0.67
VAS RIGHT TOE PRESSURE: 111 MMHG

## (undated) DEVICE — Device

## (undated) DEVICE — KENDALL 450 SERIES MONITORING FOAM ELECTRODE - RECTANGULAR SHAPE ( 3/PK): Brand: KENDALL

## (undated) DEVICE — CATHETER GUID OTW 0.035 IN 6 FRX135 CM 5 FR TRAILBLAZER

## (undated) DEVICE — CATHETER VASC DIAG SHEP FLSH PERIPH W/O HYDRPHLC COAT AD

## (undated) DEVICE — CANNULA NSL CANN NSL L25FT TBNG AD O2 SUP SFT UC

## (undated) DEVICE — KIT BEDSIDE REVITAL OX 500ML

## (undated) DEVICE — ADAPTER CLEANING PORPOISE CLEANING

## (undated) DEVICE — FORCEPS BX L240CM JAW DIA2.8MM L CAP W/ NDL MIC MESH TOOTH

## (undated) DEVICE — WIPES SKIN CLOTH READYPREP 9 X 10.5 IN 2% CHLORHEX GLUCONATE CHG PREOP

## (undated) DEVICE — BAG SPECIMEN BIOHAZARD 6IN X 9IN

## (undated) DEVICE — KIT MFLD ISOLATN NACL CNTRST PRT TBNG SPIK W/ PRSS TRNSDUC

## (undated) DEVICE — SUPPLEMENT DIGESTIVE H2O SOL GI-EASE

## (undated) DEVICE — PINNACLE INTRODUCER SHEATH: Brand: PINNACLE

## (undated) DEVICE — MICROPUNCTURE INTRODUCER SET SILHOUETTE TRANSITIONLESS PUSH-PLUS DESIGN - STIFFENED CANNULA WITH STAINLESS STEEL WIRE GUIDE: Brand: MICROPUNCTURE

## (undated) DEVICE — KIT ANGIO W/ AT P65 PREM HND CTRL FOR CNTRST DEL ANGIOTOUCH

## (undated) DEVICE — AIR/WATER CLEANING ADAPTER FOR OLYMPUS® GI ENDOSCOPE: Brand: BULLDOG®

## (undated) DEVICE — RADIFOCUS GLIDEWIRE ADVANTAGE GUIDEWIRE: Brand: GLIDEWIRE ADVANTAGE

## (undated) DEVICE — Device: Brand: DEFENDO VALVE AND CONNECTOR KIT

## (undated) DEVICE — KIT INFL DEV 20ML INSRT TOOL 3 W STPCOCK TORQ GATEWY + Y

## (undated) DEVICE — PTA BALLOON DILATATION CATHETER: Brand: MUSTANG™

## (undated) DEVICE — INFLATION DEVICE KIT: Brand: ENCORE™ 26 ADVANTAGE KIT

## (undated) DEVICE — CATHETER SHOCKWAVE E8 IVL 6.0X80 MM

## (undated) DEVICE — DESTINATION RENAL GUIDING SHEATH: Brand: DESTINATION

## (undated) DEVICE — CATHETER ANGIO MOD HK 2 0.035 IN AD 5 FRX80 CM IMPRESS

## (undated) DEVICE — TUBING, SUCTION, 1/4" X 10', STRAIGHT: Brand: MEDLINE

## (undated) DEVICE — SPONGE GZ 4IN 4IN 4 PLY N WVN AVANT

## (undated) DEVICE — CONTAINER SPEC COLL 960ML POLYPR TRIANG GRAD INTAKE/OUTPUT

## (undated) DEVICE — 4-PORT MANIFOLD: Brand: NEPTUNE 2

## (undated) DEVICE — JELLY,LUBE,STERILE,FLIP TOP,TUBE,4-OZ: Brand: MEDLINE

## (undated) DEVICE — BLOCK BITE 60FR CAREGUARD

## (undated) DEVICE — CATHETER ANGIOPLSTY L130CM BAL L250MM DIA6MM GWIRE 0.035IN

## (undated) DEVICE — YANKAUER,BULB TIP,W/O VENT,RIGID,STERILE: Brand: MEDLINE

## (undated) DEVICE — CATHETER ANGIOPLSTY L130CM BAL L250MM DIA5MM GWIRE 0.035IN